# Patient Record
Sex: MALE | Race: WHITE | NOT HISPANIC OR LATINO | Employment: FULL TIME | URBAN - METROPOLITAN AREA
[De-identification: names, ages, dates, MRNs, and addresses within clinical notes are randomized per-mention and may not be internally consistent; named-entity substitution may affect disease eponyms.]

---

## 2023-06-08 DIAGNOSIS — I10 ESSENTIAL (PRIMARY) HYPERTENSION: ICD-10-CM

## 2023-06-08 RX ORDER — FLUTICASONE PROPIONATE 50 MCG
2 SPRAY, SUSPENSION (ML) NASAL
COMMUNITY
End: 2023-12-15

## 2023-06-08 NOTE — TELEPHONE ENCOUNTER
Pharmacy Request  Pt has not been seen in office since 8/10/22  Pt needs seen for refills   Please schedule appt and send back for short supply, thanks. AM

## 2023-06-12 RX ORDER — FLUTICASONE PROPIONATE 50 MCG
SPRAY, SUSPENSION (ML) NASAL
Qty: 48 ML | OUTPATIENT
Start: 2023-06-12

## 2023-08-03 ENCOUNTER — APPOINTMENT (OUTPATIENT)
Dept: PRIMARY CARE | Facility: CLINIC | Age: 48
End: 2023-08-03
Payer: COMMERCIAL

## 2023-08-08 ENCOUNTER — OFFICE VISIT (OUTPATIENT)
Dept: PRIMARY CARE | Facility: CLINIC | Age: 48
End: 2023-08-08
Payer: COMMERCIAL

## 2023-08-08 VITALS
BODY MASS INDEX: 36 KG/M2 | HEART RATE: 69 BPM | SYSTOLIC BLOOD PRESSURE: 100 MMHG | OXYGEN SATURATION: 96 % | WEIGHT: 288 LBS | TEMPERATURE: 97.2 F | DIASTOLIC BLOOD PRESSURE: 62 MMHG

## 2023-08-08 DIAGNOSIS — I48.0 PAROXYSMAL ATRIAL FIBRILLATION (MULTI): Chronic | ICD-10-CM

## 2023-08-08 DIAGNOSIS — I10 BENIGN ESSENTIAL HYPERTENSION: Chronic | ICD-10-CM

## 2023-08-08 DIAGNOSIS — E05.90 HYPERTHYROIDISM: Primary | ICD-10-CM

## 2023-08-08 DIAGNOSIS — K57.92 ACUTE DIVERTICULITIS: ICD-10-CM

## 2023-08-08 DIAGNOSIS — J45.40 MODERATE PERSISTENT ASTHMA WITHOUT COMPLICATION (HHS-HCC): Chronic | ICD-10-CM

## 2023-08-08 DIAGNOSIS — N17.9 ACUTE RENAL FAILURE, UNSPECIFIED ACUTE RENAL FAILURE TYPE (CMS-HCC): ICD-10-CM

## 2023-08-08 PROBLEM — J45.909 ASTHMA (HHS-HCC): Status: ACTIVE | Noted: 2023-08-08

## 2023-08-08 PROBLEM — I42.9 CARDIOMYOPATHY (MULTI): Chronic | Status: ACTIVE | Noted: 2023-08-08

## 2023-08-08 PROBLEM — J30.9 ALLERGIC RHINITIS: Status: ACTIVE | Noted: 2021-04-05

## 2023-08-08 PROBLEM — I50.42 CHRONIC COMBINED SYSTOLIC AND DIASTOLIC HEART FAILURE (MULTI): Status: ACTIVE | Noted: 2021-04-05

## 2023-08-08 PROBLEM — I42.9 CARDIOMYOPATHY (MULTI): Status: ACTIVE | Noted: 2023-08-08

## 2023-08-08 PROBLEM — I50.42 CHRONIC COMBINED SYSTOLIC AND DIASTOLIC HEART FAILURE (MULTI): Chronic | Status: ACTIVE | Noted: 2021-04-05

## 2023-08-08 PROBLEM — G47.33 OBSTRUCTIVE SLEEP APNEA SYNDROME: Status: ACTIVE | Noted: 2020-05-20

## 2023-08-08 PROBLEM — I48.91 ATRIAL FIBRILLATION WITH RVR (MULTI): Status: ACTIVE | Noted: 2020-05-08

## 2023-08-08 PROBLEM — J45.909 ASTHMA (HHS-HCC): Chronic | Status: ACTIVE | Noted: 2023-08-08

## 2023-08-08 PROCEDURE — 3078F DIAST BP <80 MM HG: CPT | Performed by: FAMILY MEDICINE

## 2023-08-08 PROCEDURE — 1036F TOBACCO NON-USER: CPT | Performed by: FAMILY MEDICINE

## 2023-08-08 PROCEDURE — 3074F SYST BP LT 130 MM HG: CPT | Performed by: FAMILY MEDICINE

## 2023-08-08 PROCEDURE — 99214 OFFICE O/P EST MOD 30 MIN: CPT | Performed by: FAMILY MEDICINE

## 2023-08-08 RX ORDER — ALBUTEROL SULFATE 90 UG/1
2 AEROSOL, METERED RESPIRATORY (INHALATION) EVERY 4 HOURS PRN
COMMUNITY
Start: 2020-09-03 | End: 2023-10-23 | Stop reason: SDUPTHER

## 2023-08-08 RX ORDER — LISINOPRIL 20 MG/1
1 TABLET ORAL 2 TIMES DAILY
COMMUNITY
Start: 2020-05-12

## 2023-08-08 RX ORDER — SPIRONOLACTONE 25 MG/1
25 TABLET ORAL DAILY
COMMUNITY
Start: 2020-05-12 | End: 2024-01-29 | Stop reason: SDUPTHER

## 2023-08-08 RX ORDER — AMIODARONE HYDROCHLORIDE 200 MG/1
1 TABLET ORAL DAILY
COMMUNITY
Start: 2020-05-28 | End: 2023-10-13 | Stop reason: SDUPTHER

## 2023-08-08 RX ORDER — APIXABAN 5 MG/1
5 TABLET, FILM COATED ORAL 2 TIMES DAILY
COMMUNITY
Start: 2020-05-13 | End: 2023-10-02 | Stop reason: SDUPTHER

## 2023-08-08 RX ORDER — FLUTICASONE PROPIONATE AND SALMETEROL 250; 50 UG/1; UG/1
1 POWDER RESPIRATORY (INHALATION)
COMMUNITY
Start: 2022-04-25 | End: 2023-09-29 | Stop reason: SDUPTHER

## 2023-08-08 RX ORDER — AZELASTINE 1 MG/ML
2 SPRAY, METERED NASAL 2 TIMES DAILY
COMMUNITY
Start: 2022-08-10

## 2023-08-08 RX ORDER — ALBUTEROL SULFATE 0.83 MG/ML
2.5 SOLUTION RESPIRATORY (INHALATION) EVERY 4 HOURS PRN
COMMUNITY
Start: 2022-04-07

## 2023-08-08 RX ORDER — CARVEDILOL 25 MG/1
25 TABLET ORAL
COMMUNITY
Start: 2020-05-12 | End: 2023-10-03 | Stop reason: SDUPTHER

## 2023-08-08 RX ORDER — AMOXICILLIN AND CLAVULANATE POTASSIUM 875; 125 MG/1; MG/1
1 TABLET, FILM COATED ORAL EVERY 12 HOURS
Qty: 20 TABLET | Refills: 0 | COMMUNITY
Start: 2023-08-04 | End: 2023-08-14

## 2023-08-08 ASSESSMENT — ENCOUNTER SYMPTOMS
DIARRHEA: 0
FEVER: 0
DYSURIA: 0
COUGH: 0
CHILLS: 0
SHORTNESS OF BREATH: 0
ABDOMINAL PAIN: 0
VOMITING: 0
NAUSEA: 0

## 2023-08-08 NOTE — PROGRESS NOTES
Subjective   Patient ID: Donavan Ordaz III is a 48 y.o. male who presents for Hospital Follow-up (From [facility] on 8/3. Re: A-Fib/Thyroid).    Francisco presents for ER follow-up. Went to ER after felt fatigued and had diarrhea. Was diagnosed with diverticulitis. Given rx for Augmentin. Abdominal pain improved significantly. While in ER, told that thyroid labs abnormal.         Review of Systems   Constitutional:  Negative for chills and fever.   Respiratory:  Negative for cough and shortness of breath.    Cardiovascular:  Negative for chest pain.   Gastrointestinal:  Negative for abdominal pain, diarrhea, nausea and vomiting.   Genitourinary:  Negative for dysuria.       Objective   /62   Pulse 69   Temp 36.2 °C (97.2 °F) (Temporal)   Wt 131 kg (288 lb)   SpO2 96%   BMI 36.00 kg/m²     Physical Exam  Constitutional:       General: He is not in acute distress.     Appearance: Normal appearance.   HENT:      Head: Normocephalic.      Mouth/Throat:      Mouth: Mucous membranes are moist.   Eyes:      Extraocular Movements: Extraocular movements intact.      Conjunctiva/sclera: Conjunctivae normal.   Cardiovascular:      Rate and Rhythm: Normal rate and regular rhythm.      Heart sounds: No murmur heard.  Pulmonary:      Breath sounds: No wheezing or rhonchi.   Musculoskeletal:      Cervical back: Neck supple.   Skin:     General: Skin is warm and dry.   Neurological:      Mental Status: He is alert.   Psychiatric:         Mood and Affect: Mood normal.         Behavior: Behavior normal.         Assessment/Plan   Diagnoses and all orders for this visit:  Hyperthyroidism  Comments:  Possibly due to amiodarone. Recheck thyroid labs with Grave's antibodies.  Orders:  -     Thyroxine binding globulin; Future  -     T3, free; Future  -     T4, free; Future  -     TSH; Future  -     Thyroid stimulating immunoglobulin; Future  Acute diverticulitis  Comments:  Improving. Complete course of Augmentin.  Orders:  -      CBC and Auto Differential; Future  Acute renal failure, unspecified acute renal failure type (CMS/HCC)  Comments:  Likely due to dehydration. Repeat labs ordered.  Orders:  -     Basic Metabolic Panel; Future  Paroxysmal atrial fibrillation (CMS/HCC)  -     Referral to Cardiology; Future  Benign essential hypertension  -     Referral to Cardiology; Future  Moderate persistent asthma without complication

## 2023-08-10 ENCOUNTER — LAB (OUTPATIENT)
Dept: LAB | Facility: LAB | Age: 48
End: 2023-08-10
Payer: COMMERCIAL

## 2023-08-10 DIAGNOSIS — K57.92 ACUTE DIVERTICULITIS: ICD-10-CM

## 2023-08-10 DIAGNOSIS — E05.90 HYPERTHYROIDISM: ICD-10-CM

## 2023-08-10 DIAGNOSIS — N17.9 ACUTE RENAL FAILURE, UNSPECIFIED ACUTE RENAL FAILURE TYPE (CMS-HCC): ICD-10-CM

## 2023-08-10 LAB
ANION GAP IN SER/PLAS: 16 MMOL/L (ref 10–20)
BASOPHILS (10*3/UL) IN BLOOD BY AUTOMATED COUNT: 0.06 X10E9/L (ref 0–0.1)
BASOPHILS/100 LEUKOCYTES IN BLOOD BY AUTOMATED COUNT: 0.7 % (ref 0–2)
CALCIUM (MG/DL) IN SER/PLAS: 10.2 MG/DL (ref 8.6–10.3)
CARBON DIOXIDE, TOTAL (MMOL/L) IN SER/PLAS: 22 MMOL/L (ref 21–32)
CHLORIDE (MMOL/L) IN SER/PLAS: 106 MMOL/L (ref 98–107)
CREATININE (MG/DL) IN SER/PLAS: 1.07 MG/DL (ref 0.5–1.3)
EOSINOPHILS (10*3/UL) IN BLOOD BY AUTOMATED COUNT: 0.61 X10E9/L (ref 0–0.7)
EOSINOPHILS/100 LEUKOCYTES IN BLOOD BY AUTOMATED COUNT: 7.2 % (ref 0–6)
ERYTHROCYTE DISTRIBUTION WIDTH (RATIO) BY AUTOMATED COUNT: 11.2 % (ref 11.5–14.5)
ERYTHROCYTE MEAN CORPUSCULAR HEMOGLOBIN CONCENTRATION (G/DL) BY AUTOMATED: 31.6 G/DL (ref 32–36)
ERYTHROCYTE MEAN CORPUSCULAR VOLUME (FL) BY AUTOMATED COUNT: 94 FL (ref 80–100)
ERYTHROCYTES (10*6/UL) IN BLOOD BY AUTOMATED COUNT: 4.86 X10E12/L (ref 4.5–5.9)
GFR MALE: 84 ML/MIN/1.73M2
GLUCOSE (MG/DL) IN SER/PLAS: 90 MG/DL (ref 74–99)
HEMATOCRIT (%) IN BLOOD BY AUTOMATED COUNT: 45.9 % (ref 41–52)
HEMOGLOBIN (G/DL) IN BLOOD: 14.5 G/DL (ref 13.5–17.5)
IMMATURE GRANULOCYTES/100 LEUKOCYTES IN BLOOD BY AUTOMATED COUNT: 0.4 % (ref 0–0.9)
LEUKOCYTES (10*3/UL) IN BLOOD BY AUTOMATED COUNT: 8.5 X10E9/L (ref 4.4–11.3)
LYMPHOCYTES (10*3/UL) IN BLOOD BY AUTOMATED COUNT: 1.98 X10E9/L (ref 1.2–4.8)
LYMPHOCYTES/100 LEUKOCYTES IN BLOOD BY AUTOMATED COUNT: 23.4 % (ref 13–44)
MONOCYTES (10*3/UL) IN BLOOD BY AUTOMATED COUNT: 1.12 X10E9/L (ref 0.1–1)
MONOCYTES/100 LEUKOCYTES IN BLOOD BY AUTOMATED COUNT: 13.2 % (ref 2–10)
NEUTROPHILS (10*3/UL) IN BLOOD BY AUTOMATED COUNT: 4.66 X10E9/L (ref 1.2–7.7)
NEUTROPHILS/100 LEUKOCYTES IN BLOOD BY AUTOMATED COUNT: 55.1 % (ref 40–80)
PLATELETS (10*3/UL) IN BLOOD AUTOMATED COUNT: 393 X10E9/L (ref 150–450)
POTASSIUM (MMOL/L) IN SER/PLAS: 5.2 MMOL/L (ref 3.5–5.3)
SODIUM (MMOL/L) IN SER/PLAS: 139 MMOL/L (ref 136–145)
THYROTROPIN (MIU/L) IN SER/PLAS BY DETECTION LIMIT <= 0.05 MIU/L: <0.01 MIU/L (ref 0.44–3.98)
THYROXINE (T4) FREE (NG/DL) IN SER/PLAS: 5.51 NG/DL (ref 0.61–1.12)
TRIIODOTHYRONINE (T3) FREE (PG/ML) IN SER/PLAS: >10 PG/ML (ref 2.3–4.2)
UREA NITROGEN (MG/DL) IN SER/PLAS: 33 MG/DL (ref 6–23)

## 2023-08-10 PROCEDURE — 36415 COLL VENOUS BLD VENIPUNCTURE: CPT

## 2023-08-10 PROCEDURE — 84442 ASSAY OF THYROID ACTIVITY: CPT

## 2023-08-10 PROCEDURE — 84445 ASSAY OF TSI GLOBULIN: CPT

## 2023-08-10 PROCEDURE — 84481 FREE ASSAY (FT-3): CPT

## 2023-08-10 PROCEDURE — 85025 COMPLETE CBC W/AUTO DIFF WBC: CPT

## 2023-08-10 PROCEDURE — 84439 ASSAY OF FREE THYROXINE: CPT

## 2023-08-10 PROCEDURE — 84443 ASSAY THYROID STIM HORMONE: CPT

## 2023-08-10 PROCEDURE — 80048 BASIC METABOLIC PNL TOTAL CA: CPT

## 2023-08-14 LAB — THYROXINE BINDING GLOBULIN: 14.8 UG/ML (ref 13–30)

## 2023-08-16 ENCOUNTER — TELEPHONE (OUTPATIENT)
Dept: PRIMARY CARE | Facility: CLINIC | Age: 48
End: 2023-08-16
Payer: COMMERCIAL

## 2023-08-16 NOTE — TELEPHONE ENCOUNTER
----- Message from Gloria Barger DO sent at 8/16/2023  1:24 PM EDT -----  Please let patient know that his thyroid labs are significantly abnormal. These abnormalities can cause abnormal heart rhythms. Does he have an appointment scheduled with his cardiologist? Need to get cardiology opinion on whether amiodarone is causing thyroid issues as soon as possible.

## 2023-08-17 LAB — THYROID STIMULATING IMMUNOGLOBULIN: <1 TSI INDEX

## 2023-08-18 NOTE — TELEPHONE ENCOUNTER
Anne returned call- informed of provider appendage. She did state that he has an appt with Cardio this coming Tuesday and they will discuss the Amiodarone. Thanks, CG

## 2023-09-14 ENCOUNTER — HOSPITAL ENCOUNTER (OUTPATIENT)
Dept: DATA CONVERSION | Facility: HOSPITAL | Age: 48
End: 2023-09-14
Attending: INTERNAL MEDICINE | Admitting: INTERNAL MEDICINE
Payer: COMMERCIAL

## 2023-09-14 DIAGNOSIS — I48.0 PAROXYSMAL ATRIAL FIBRILLATION (MULTI): ICD-10-CM

## 2023-09-14 DIAGNOSIS — I42.9 CARDIOMYOPATHY, UNSPECIFIED (MULTI): ICD-10-CM

## 2023-09-14 LAB
ANION GAP IN SER/PLAS: 15 MMOL/L (ref 10–20)
ATRIAL RATE: 88 BPM
CALCIUM (MG/DL) IN SER/PLAS: 9.5 MG/DL (ref 8.6–10.3)
CARBON DIOXIDE, TOTAL (MMOL/L) IN SER/PLAS: 22 MMOL/L (ref 21–32)
CHLORIDE (MMOL/L) IN SER/PLAS: 107 MMOL/L (ref 98–107)
CREATININE (MG/DL) IN SER/PLAS: 1.05 MG/DL (ref 0.5–1.3)
ERYTHROCYTE DISTRIBUTION WIDTH (RATIO) BY AUTOMATED COUNT: 12.8 % (ref 11.5–14.5)
ERYTHROCYTE MEAN CORPUSCULAR HEMOGLOBIN CONCENTRATION (G/DL) BY AUTOMATED: 32.6 G/DL (ref 32–36)
ERYTHROCYTE MEAN CORPUSCULAR VOLUME (FL) BY AUTOMATED COUNT: 90 FL (ref 80–100)
ERYTHROCYTES (10*6/UL) IN BLOOD BY AUTOMATED COUNT: 4.92 X10E12/L (ref 4.5–5.9)
GFR MALE: 86 ML/MIN/1.73M2
GLUCOSE (MG/DL) IN SER/PLAS: 85 MG/DL (ref 74–99)
HEMATOCRIT (%) IN BLOOD BY AUTOMATED COUNT: 44.5 % (ref 41–52)
HEMOGLOBIN (G/DL) IN BLOOD: 14.5 G/DL (ref 13.5–17.5)
LEUKOCYTES (10*3/UL) IN BLOOD BY AUTOMATED COUNT: 8.5 X10E9/L (ref 4.4–11.3)
P AXIS: 39 DEGREES
P OFFSET: 190 MS
P ONSET: 130 MS
PLATELETS (10*3/UL) IN BLOOD AUTOMATED COUNT: 309 X10E9/L (ref 150–450)
POTASSIUM (MMOL/L) IN SER/PLAS: 4.7 MMOL/L (ref 3.5–5.3)
PR INTERVAL: 174 MS
Q ONSET: 217 MS
QRS COUNT: 14 BEATS
QRS DURATION: 88 MS
QT INTERVAL: 366 MS
QTC CALCULATION(BAZETT): 442 MS
QTC FREDERICIA: 416 MS
R AXIS: 16 DEGREES
SODIUM (MMOL/L) IN SER/PLAS: 139 MMOL/L (ref 136–145)
T AXIS: 22 DEGREES
T OFFSET: 400 MS
UREA NITROGEN (MG/DL) IN SER/PLAS: 21 MG/DL (ref 6–23)
VENTRICULAR RATE: 88 BPM

## 2023-09-29 DIAGNOSIS — J45.40 MODERATE PERSISTENT ASTHMA WITHOUT COMPLICATION (HHS-HCC): Primary | Chronic | ICD-10-CM

## 2023-09-29 NOTE — TELEPHONE ENCOUNTER
Pt called rx line at 1255 requesting refill on inhaler to go to [email or web address].  Pt next OV 2/2024.  Pt is compliant.  Pt uses [email or web address]. Thanks, CG

## 2023-09-30 NOTE — H&P
History & Physical Reviewed:   I have reviewed the History and Physical dated:  22-Aug-2023   History and Physical reviewed and relevant findings noted. Patient examined to review pertinent physical  findings.: No significant changes   Home Medications Reviewed: no changes noted   Allergies Reviewed: no changes noted       Airway/Sedation Assessment:  ·  Emotional Status calm   ·  Neurologic alert & oriented x 3   ·  Respiratory clear to auscultation   ·  Cardiovascular irregular rate and rhythm   ·  Mouth Opening OK yes   ·  Neck Flexibility OK yes   ·  Loose Teeth no   ·  Oropharyngeal Classification Class II   ·  ASA PS Classification ASA III   ·  Sedation Plan moderate sedation       ERAS (Enhanced Recovery After Surgery):  ·  ERAS Patient: no     Consent:   COVID-19 Consent:  ·  COVID-19 Risk Consent Surgeon has reviewed key risks related to the risk of narinder COVID-19 and if they contract COVID-19 what the risks are.     Assessment/Plan:   ·  Assessment and Plan    Impression: Afib     Plan: DCC      Electronic Signatures:  Bree Martinez (APRN-CNP)  (Signed 14-Sep-2023 14:24)   Authored: History & Physical Reviewed, Airway/Sedation,  ERAS, Consent, Assessment/Plan, Note Completion      Last Updated: 14-Sep-2023 14:24 by Bree Martinez (APRN-CNP)

## 2023-10-01 DIAGNOSIS — I48.91 ATRIAL FIBRILLATION, UNSPECIFIED TYPE (MULTI): Primary | ICD-10-CM

## 2023-10-02 RX ORDER — APIXABAN 5 MG/1
5 TABLET, FILM COATED ORAL 2 TIMES DAILY
Qty: 180 TABLET | Refills: 1 | Status: SHIPPED | OUTPATIENT
Start: 2023-10-02 | End: 2024-01-29 | Stop reason: SDUPTHER

## 2023-10-02 RX ORDER — FLUTICASONE PROPIONATE AND SALMETEROL 250; 50 UG/1; UG/1
1 POWDER RESPIRATORY (INHALATION)
Qty: 60 EACH | Refills: 5 | Status: SHIPPED | OUTPATIENT
Start: 2023-10-02

## 2023-10-03 DIAGNOSIS — I48.91 ATRIAL FIBRILLATION, UNSPECIFIED TYPE (MULTI): Primary | ICD-10-CM

## 2023-10-03 RX ORDER — CARVEDILOL 25 MG/1
25 TABLET ORAL
Qty: 180 TABLET | Refills: 1 | Status: SHIPPED | OUTPATIENT
Start: 2023-10-03 | End: 2024-01-26

## 2023-10-03 RX ORDER — AMIODARONE HYDROCHLORIDE 200 MG/1
200 TABLET ORAL DAILY
Qty: 90 TABLET | Refills: 1 | Status: CANCELLED | OUTPATIENT
Start: 2023-10-03 | End: 2024-10-02

## 2023-10-04 PROBLEM — E66.01 CLASS 2 SEVERE OBESITY DUE TO EXCESS CALORIES WITH SERIOUS COMORBIDITY AND BODY MASS INDEX (BMI) OF 36.0 TO 36.9 IN ADULT (MULTI): Status: ACTIVE | Noted: 2023-10-04

## 2023-10-04 PROBLEM — Z79.899 LONG TERM CURRENT USE OF AMIODARONE: Status: ACTIVE | Noted: 2023-10-04

## 2023-10-04 PROBLEM — E66.812 CLASS 2 SEVERE OBESITY DUE TO EXCESS CALORIES WITH SERIOUS COMORBIDITY AND BODY MASS INDEX (BMI) OF 36.0 TO 36.9 IN ADULT: Status: ACTIVE | Noted: 2023-10-04

## 2023-10-04 PROBLEM — Z79.01 ON APIXABAN THERAPY: Status: ACTIVE | Noted: 2023-10-04

## 2023-10-09 ENCOUNTER — APPOINTMENT (OUTPATIENT)
Dept: CARDIOLOGY | Facility: CLINIC | Age: 48
End: 2023-10-09
Payer: COMMERCIAL

## 2023-10-09 ENCOUNTER — TELEMEDICINE (OUTPATIENT)
Dept: PRIMARY CARE | Facility: CLINIC | Age: 48
End: 2023-10-09
Payer: COMMERCIAL

## 2023-10-09 DIAGNOSIS — R06.2 WHEEZING: ICD-10-CM

## 2023-10-09 DIAGNOSIS — R05.1 ACUTE COUGH: Primary | ICD-10-CM

## 2023-10-09 DIAGNOSIS — J01.90 ACUTE NON-RECURRENT SINUSITIS, UNSPECIFIED LOCATION: ICD-10-CM

## 2023-10-09 PROCEDURE — 99213 OFFICE O/P EST LOW 20 MIN: CPT | Performed by: FAMILY MEDICINE

## 2023-10-09 RX ORDER — BENZONATATE 200 MG/1
200 CAPSULE ORAL 3 TIMES DAILY PRN
Qty: 42 CAPSULE | Refills: 0 | Status: SHIPPED | OUTPATIENT
Start: 2023-10-09 | End: 2023-10-23 | Stop reason: WASHOUT

## 2023-10-09 ASSESSMENT — LIFESTYLE VARIABLES: HISTORY_OF_SMOKING: I HAVE NEVER SMOKED

## 2023-10-09 NOTE — PROGRESS NOTES
Sinus sxs started last week--6 days--overall staying the same  Facial pressure  Usually goes into lungs  Coughing a lot--using albuterol-- no relief  Has wheezing at night--  No heavy chest of SOB  No F, chills aches  No N,V,D  +HA  ST from PND  Drainage is clear   No seasonal allergies  OTC meds--tylenol--    BENZONATATE  NO mucinex DM because he is concerned it will raise his BP    ALL OTHER ROS (-)    General appearance:  Vitals available from patient?No  Alert, oriented, pleasant, in no apparent distress Yes  Answers questions appropriatelyYes  Eyes clear?Yes  Throat exam Not Available  Is patient in respiratory distressNo  Is patient coughing during consult:Yes  Audible wheezing noted? :No  Pt sounds congested?: No  Sniffing or rhinorrhea?: No  Frontal sinus TTP by palpation? Yes  Maxillary sinus TTP by palpation?Yes  Tender neck LAD by pt exam?:No  Psychiatric: Affect normalYes  Other relevant physical exam:      Pt location in [place] and consent obtained. Telemedicine appropriate evaluation completed. Appropriate physical exam done.    6 days of sinus sxs-- discussed viral vs bacterial  Pt reached out next day when symptoms worsened to request abx and prednisone (usually needs this when cough gets bad and albuterol no longer relieves symptoms) rx written for augmentin and prednisone (all AE discussed of prednisone)

## 2023-10-10 RX ORDER — PREDNISONE 20 MG/1
40 TABLET ORAL DAILY
Qty: 10 TABLET | Refills: 0 | Status: SHIPPED | OUTPATIENT
Start: 2023-10-10 | End: 2023-10-15

## 2023-10-10 RX ORDER — AMOXICILLIN AND CLAVULANATE POTASSIUM 875; 125 MG/1; MG/1
1 TABLET, FILM COATED ORAL 2 TIMES DAILY
Qty: 14 TABLET | Refills: 0 | Status: SHIPPED | OUTPATIENT
Start: 2023-10-10 | End: 2023-10-23 | Stop reason: WASHOUT

## 2023-10-13 ENCOUNTER — PHARMACY VISIT (OUTPATIENT)
Dept: PHARMACY | Facility: CLINIC | Age: 48
End: 2023-10-13
Payer: COMMERCIAL

## 2023-10-13 DIAGNOSIS — I48.0 PAROXYSMAL ATRIAL FIBRILLATION (MULTI): Primary | Chronic | ICD-10-CM

## 2023-10-13 PROCEDURE — RXMED WILLOW AMBULATORY MEDICATION CHARGE

## 2023-10-13 RX ORDER — AMIODARONE HYDROCHLORIDE 200 MG/1
200 TABLET ORAL DAILY
Qty: 90 TABLET | Refills: 1 | Status: SHIPPED | OUTPATIENT
Start: 2023-10-13 | End: 2024-01-29 | Stop reason: SDUPTHER

## 2023-10-14 NOTE — PATIENT INSTRUCTIONS
Please send me a [employer] message if you have any questions or concerns.  FOR NON URGENT questions only.  Allow up to 72 hours for response.    If you have prescription issues or other questions you can email   Lori Javed,  Digital Health Coordinator, at   [email or web address]    6 days of sinus sxs-- discussed viral vs bacterial  Pt reached out next day when symptoms worsened to request abx and prednisone (usually needs this when cough gets bad and albuterol no longer relieves symptoms) rx written for augmentin and prednisone (all AE discussed of prednisone)    Rest and drink plenty of fluids    Tylenol and or motrin as needed for pain and fever (unless you have been told not to take these because of your personal medical history)    Discussed options and precautions:   Viral versus bacterial infection; use of medications; possible side effects; appropriate over-the-counter medications; possible complications and /or when to follow-up.    Follow-up in 1 to 2 days if not improving.  Follow-up immediately if symptoms worsen.    All red flags requiring in person care were discussed.  All patient's questions were answered.    Limitations to telemedicine include inability to do a complete and accurate physical exam.  Any concerns regarding this were conveyed with the patient and in person follow-up recommended if patient nature of illness does not progress as anticipated during this visit.    Over-the-counter cold and cough medications    Take Mucinex for cough, drink plenty of fluids with this medication and will help break up congestion making it easier to cough up    For cough can use honey (children ages 1 and up) in hot tea or hot water. I recommend putting this in an insulated cup and carrying it around throughout the day to sip on.  Have it at your bedside at night in case you wake up coughing.  You can also use honey cough drops (adults and older children).    Recommend nasal saline for use in  children and adults.  Neti Rand can also be helpful.  (Never used tap water and a Neti pot.  Use distilled water.)    If you have plugged up congested ears or the feeling of fluid in your ears, you can use an over-the-counter nasal steroid spray like fluticasone (brand name Flonase) use 2 sprays into each nostril once or twice a day for 7 days.  This will help open up the eustachian tubes and allow the fluid to drain out of your ears.    Sleeping with your head/chest elevated can help with sinus drainage.    Adults only-can use nasal decongestant (like Afrin) at bedtime to open nasal passages so you can breathe through your nose while you sleep; avoid using for longer than 3 days as this medication can become addicting.  Do not use if you have high blood pressure or high heart rate.    For severe pain or fever in adult-Tylenol (2 extra strength) or ibuprofen (3-4 tabs equals 600 to 800 mg) alternating as needed for pain.  Tylenol doses should be 6 to 8 hours apart and ibuprofen doses should be 6 to 8 hours apart.        Common cold medications for adults explained:    **Cold medications for children are not recommended-only Tylenol, Motrin, honey (only for children older than 1 year), cool-mist humidifier, and nasal saline spray are recommended for children.    Mucinex-(generic name guaifenesin)-is an expectorant.  This will thin out all the thick mucus.  Must drink plenty of liquids for this medicine to work.    Dextromethorphan (brand name Delsym or DM)-this medicine is a cough suppressant    Honey in hot water or tea-this is a natural cough suppressant    Decongestant nasal spray- (eg: Afrin) use for temporary relief of nasal congestion-best when used at bedtime to open up nasal passages so that you are not forced to mouth breathe overnight.    Sudafed (generic name pseudoephedrine-this must be bought from the pharmacist) DO NOT use this medicine if you have high blood pressure as it can raise your blood  pressure higher.  Do not use if you have any irregular heart rate.  This medicine can help clear congestion in your sinuses.

## 2023-10-23 ENCOUNTER — TELEMEDICINE (OUTPATIENT)
Dept: PRIMARY CARE | Facility: CLINIC | Age: 48
End: 2023-10-23
Payer: COMMERCIAL

## 2023-10-23 ENCOUNTER — APPOINTMENT (OUTPATIENT)
Dept: CARDIOLOGY | Facility: CLINIC | Age: 48
End: 2023-10-23
Payer: COMMERCIAL

## 2023-10-23 DIAGNOSIS — J45.901 MODERATE ACUTE EXACERBATION OF ASTHMA (HHS-HCC): ICD-10-CM

## 2023-10-23 DIAGNOSIS — J01.90 ACUTE SINUSITIS, RECURRENCE NOT SPECIFIED, UNSPECIFIED LOCATION: Primary | ICD-10-CM

## 2023-10-23 DIAGNOSIS — J45.40 MODERATE PERSISTENT ASTHMA WITHOUT COMPLICATION (HHS-HCC): Chronic | ICD-10-CM

## 2023-10-23 PROCEDURE — 99214 OFFICE O/P EST MOD 30 MIN: CPT | Performed by: PHYSICIAN ASSISTANT

## 2023-10-23 RX ORDER — DOXYCYCLINE 100 MG/1
100 CAPSULE ORAL 2 TIMES DAILY
Qty: 20 CAPSULE | Refills: 0 | Status: SHIPPED | OUTPATIENT
Start: 2023-10-23 | End: 2023-11-02

## 2023-10-23 RX ORDER — ALBUTEROL SULFATE 90 UG/1
2 AEROSOL, METERED RESPIRATORY (INHALATION) EVERY 4 HOURS PRN
Qty: 18 G | Refills: 1 | Status: SHIPPED | OUTPATIENT
Start: 2023-10-23 | End: 2023-12-26 | Stop reason: SDUPTHER

## 2023-10-23 RX ORDER — PREDNISONE 10 MG/1
TABLET ORAL
Qty: 20 TABLET | Refills: 0 | Status: SHIPPED | OUTPATIENT
Start: 2023-10-23 | End: 2023-10-30

## 2023-10-23 ASSESSMENT — ENCOUNTER SYMPTOMS
CHILLS: 0
SWEATS: 0
SHORTNESS OF BREATH: 1
WHEEZING: 0
RHINORRHEA: 1
HEMOPTYSIS: 0
SORE THROAT: 0
FEVER: 0
COUGH: 1
HEARTBURN: 0
MYALGIAS: 0
HEADACHES: 0
WEIGHT LOSS: 0

## 2023-10-23 NOTE — PROGRESS NOTES
Subjective   Patient ID: Francisco Ordaz III is a 48 y.o. male who presents for URI (And SOB).    Virtual or Telephone Consent    An interactive audio and video telecommunication system which permits real time communications between the patient (at the originating site) and provider (at the distant site) was utilized to provide this telehealth service.   Verbal consent was requested and obtained from Donavan Ordaz III on this date, 10/23/23 for a telehealth visit.     Cough  This is a new problem. The current episode started 1 to 4 weeks ago. The problem has been gradually worsening. The problem occurs every few minutes. The cough is Productive of sputum. Associated symptoms include ear congestion, nasal congestion, postnasal drip, rhinorrhea and shortness of breath. Pertinent negatives include no chest pain, chills, fever, headaches, heartburn, hemoptysis, myalgias, rash, sore throat, sweats, weight loss or wheezing.   Lungs feel tight and has mild SOB. Not been using his Wixela inhaler.     Patient denies recent known COVID exposure or international travel.     Had virtual appt with urgent care 10/9/23.  Was on Augmentin, which helped sinus symptoms but symptoms didn't resolve and have worsened again the past week.     reports that he has never smoked. He has never used smokeless tobacco.     No Known Allergies    Review of Systems   Constitutional:  Negative for chills, fever and weight loss.   HENT:  Positive for postnasal drip and rhinorrhea. Negative for sore throat.    Respiratory:  Positive for cough and shortness of breath. Negative for hemoptysis and wheezing.    Cardiovascular:  Negative for chest pain.   Gastrointestinal:  Negative for heartburn.   Musculoskeletal:  Negative for myalgias.   Skin:  Negative for rash.   Neurological:  Negative for headaches.       Objective   There were no vitals taken for this visit.    Physical Exam  Constitutional:       General: He is not in acute distress.      Appearance: Normal appearance.   HENT:      Head: Normocephalic.   Eyes:      General: No scleral icterus.  Pulmonary:      Effort: Pulmonary effort is normal.   Neurological:      Mental Status: He is alert.         Assessment/Plan   Diagnoses and all orders for this visit:  Acute sinusitis, recurrence not specified, unspecified location  -     doxycycline (Vibramycin) 100 mg capsule; Take 1 capsule (100 mg) by mouth 2 times a day for 10 days.  Moderate acute exacerbation of asthma  -     predniSONE (Deltasone) 10 mg tablet; Take 4 tablets (40 mg) by mouth once daily for 2 days, THEN 3 tablets (30 mg) once daily for 2 days, THEN 2 tablets (20 mg) once daily for 2 days, THEN 1 tablet (10 mg) once daily for 2 days.  Moderate persistent asthma without complication  -     albuterol 90 mcg/actuation inhaler; Inhale 2 puffs every 4 hours if needed for wheezing or shortness of breath.         Rx doxycycline.   Prednisone taper.  Restart Wixela inhaler.   Use Mucinex DM.  Encourage fluids and rest.  Follow-up if symptoms significant increase or persist.      Duration of video visit: 9m 42s

## 2023-11-01 PROBLEM — E66.9 OBESITY (BMI 30-39.9): Status: ACTIVE | Noted: 2023-11-01

## 2023-11-06 ENCOUNTER — APPOINTMENT (OUTPATIENT)
Dept: CARDIOLOGY | Facility: CLINIC | Age: 48
End: 2023-11-06
Payer: COMMERCIAL

## 2023-12-11 ENCOUNTER — APPOINTMENT (OUTPATIENT)
Dept: CARDIOLOGY | Facility: CLINIC | Age: 48
End: 2023-12-11
Payer: COMMERCIAL

## 2023-12-15 DIAGNOSIS — I42.9 CARDIOMYOPATHY, UNSPECIFIED TYPE (MULTI): Primary | Chronic | ICD-10-CM

## 2023-12-15 DIAGNOSIS — I10 BENIGN ESSENTIAL HYPERTENSION: Chronic | ICD-10-CM

## 2023-12-15 DIAGNOSIS — J30.9 ALLERGIC RHINITIS, UNSPECIFIED SEASONALITY, UNSPECIFIED TRIGGER: Primary | ICD-10-CM

## 2023-12-15 PROCEDURE — RXMED WILLOW AMBULATORY MEDICATION CHARGE

## 2023-12-15 RX ORDER — FLUTICASONE PROPIONATE 50 MCG
2 SPRAY, SUSPENSION (ML) NASAL
Qty: 48 ML | Refills: 1 | Status: SHIPPED | OUTPATIENT
Start: 2023-12-15

## 2023-12-18 PROCEDURE — RXMED WILLOW AMBULATORY MEDICATION CHARGE

## 2023-12-18 RX ORDER — SPIRONOLACTONE 25 MG/1
25 TABLET ORAL DAILY
Qty: 90 TABLET | Refills: 1 | Status: SHIPPED | OUTPATIENT
Start: 2023-12-18 | End: 2024-12-17

## 2023-12-18 RX ORDER — LISINOPRIL 20 MG/1
20 TABLET ORAL 2 TIMES DAILY
Qty: 180 TABLET | Refills: 1 | Status: SHIPPED | OUTPATIENT
Start: 2023-12-18 | End: 2024-01-29 | Stop reason: SDUPTHER

## 2023-12-19 ENCOUNTER — PHARMACY VISIT (OUTPATIENT)
Dept: PHARMACY | Facility: CLINIC | Age: 48
End: 2023-12-19
Payer: COMMERCIAL

## 2023-12-26 ENCOUNTER — TELEMEDICINE (OUTPATIENT)
Dept: PRIMARY CARE | Facility: CLINIC | Age: 48
End: 2023-12-26
Payer: COMMERCIAL

## 2023-12-26 DIAGNOSIS — J40 BRONCHITIS: Primary | ICD-10-CM

## 2023-12-26 DIAGNOSIS — J45.21 MILD INTERMITTENT ASTHMA WITH EXACERBATION (HHS-HCC): ICD-10-CM

## 2023-12-26 DIAGNOSIS — J45.40 MODERATE PERSISTENT ASTHMA WITHOUT COMPLICATION (HHS-HCC): Chronic | ICD-10-CM

## 2023-12-26 PROCEDURE — 99213 OFFICE O/P EST LOW 20 MIN: CPT | Performed by: FAMILY MEDICINE

## 2023-12-26 RX ORDER — ALBUTEROL SULFATE 90 UG/1
2 AEROSOL, METERED RESPIRATORY (INHALATION) EVERY 4 HOURS PRN
Qty: 18 G | Refills: 1 | Status: SHIPPED | OUTPATIENT
Start: 2023-12-26

## 2023-12-26 RX ORDER — AMOXICILLIN AND CLAVULANATE POTASSIUM 875; 125 MG/1; MG/1
875 TABLET, FILM COATED ORAL 2 TIMES DAILY
Qty: 20 TABLET | Refills: 0 | Status: SHIPPED | OUTPATIENT
Start: 2023-12-26 | End: 2024-01-05

## 2023-12-26 RX ORDER — PREDNISONE 20 MG/1
40 TABLET ORAL DAILY
Qty: 10 TABLET | Refills: 0 | Status: SHIPPED | OUTPATIENT
Start: 2023-12-26 | End: 2023-12-31

## 2023-12-26 ASSESSMENT — ENCOUNTER SYMPTOMS
COUGH: 1
FEVER: 0
WHEEZING: 1
SHORTNESS OF BREATH: 1
FATIGUE: 0
SINUS PAIN: 1
SINUS PRESSURE: 1

## 2023-12-26 NOTE — PROGRESS NOTES
Subjective   Patient ID: Francisco Ordaz III is a 48 y.o. male who presents for No chief complaint on file..    Virtual or Telephone Consent    An interactive audio and video telecommunication system which permits real time communications between the patient (at the originating site) and provider (at the distant site) was utilized to provide this telehealth service.   Verbal consent was requested and obtained from Donavan Ordaz III on this date, 12/26/23 for a telehealth visit.       Pt presents with 2 weeks of nasal congestion and sinus pressure  Cough is productive of green phlegm as well   He took a flight recently and this is typically when he gets sick  He has asthma, steroids have helped in the past         Review of Systems   Constitutional:  Negative for fatigue and fever.   HENT:  Positive for congestion, sinus pressure and sinus pain.    Respiratory:  Positive for cough, shortness of breath and wheezing.        Objective   There were no vitals taken for this visit.    Physical Exam  Constitutional:       Appearance: Normal appearance.   HENT:      Nose: Congestion present.   Pulmonary:      Effort: Pulmonary effort is normal.   Neurological:      Mental Status: He is alert.       Virtual Visit Duration: 8 min  Photo ID: verified  NKDA: verified      Assessment/Plan   Diagnoses and all orders for this visit:  Bronchitis  -     amoxicillin-pot clavulanate (Augmentin) 875-125 mg tablet; Take 1 tablet (875 mg) by mouth 2 times a day for 10 days.  Mild intermittent asthma with exacerbation  -     predniSONE (Deltasone) 20 mg tablet; Take 2 tablets (40 mg) by mouth once daily for 5 days.  Moderate persistent asthma without complication  -     albuterol 90 mcg/actuation inhaler; Inhale 2 puffs every 4 hours if needed for wheezing or shortness of breath.      I counseled patient regarding the risks, benefits and side effects of antibiotics, as well as steroids. Recc supportive care, fluids, rest, Mucinex  I  advised the patient to have an in person exam with PCP, urgent care, or Emergency Room with any exacerbation of symptoms. The patient understands and agrees.   Falguni Dugan, DO

## 2024-01-23 NOTE — PROGRESS NOTES
[facility] Card[facility]    Patient Name: Donavan Ordaz III  Patient : 1975      Scribe Attestation  By signing my name below, ITonia Scribe   attest that this documentation has been prepared under the direction and in the presence of Tony Mckeon DO.      Reason for visit:  This is a 48-year-old male here for follow-up regarding paroxysmal atrial fibrillation, anticoagulation with apixaban, antiarrhythmic drug therapy with amiodarone, cardiomyopathy with ejection fraction zully of 20% now 45%, hypertension, obstructive sleep apnea, and obesity.     HPI:  This is a 48-year-old male here for follow-up regarding paroxysmal atrial fibrillation, anticoagulation with apixaban, antiarrhythmic drug therapy with amiodarone, cardiomyopathy with ejection fraction zully of 20% now 45%, hypertension, obstructive sleep apnea, and obesity.  The patient was last evaluated by me in 2023.  At that visit I had ordered an echocardiogram, referred the patient to EP cardiology for evaluation of ablation, referred the patient to endocrinology, scheduled him for an electrical cardioversion, and asked that he follow-up in 1 month.  The patient underwent successful cardioversion on 2023.  CBC done in 2023 showed a hemoglobin of 14.5, BMP showed normal serum sodium and potassium with a serum creatinine of 1.05.  TSH done in August was less than 0.01.  Echocardiogram done in 2023 showed mildly reduced left ventricular systolic function with an ejection fraction of 45 to 50%, dilated right ventricle with normal right ventricular systolic function, biatrial dilatation, mild dilatation of the ascending aorta measured at 4.0 cm. ECG done today showed ***         Assessment/Plan:   1. Paroxysmal atrial fibrillation  The patient has a history of paroxysmal atrial fibrillation and underwent successful cardioversion on 2023.   He is on apixaban for  thromboembolism prophylaxis, which should be continued.  He should continue carvedilol for heart rate control and amiodarone to reduce his burden of atrial fibrillation.  ECG done today showed ***  He denies chest pain, palpitations or lightheadedness.   Echocardiogram done in September 2023 showed mildly reduced left ventricular systolic function with an ejection fraction of 45 to 50%, dilated right ventricle with normal right ventricular systolic function, biatrial dilatation, mild dilatation of the ascending aorta measured at 4.0 cm.   Recent lab works as noted in the HPI.   Lab works as noted below will be done in 3 months prior to his next visit.   Follow up in 3 months and sooner if necessary.      2. Anticoagulation with apixaban  The patient is on anticoagulation with apixaban for paroxysmal atrial fibrillation.  Recent lab works as noted in the HPI.  Follow up in 3 months and sooner if necessary.      3. Antiarrhythmic drug therapy with amiodarone  The patient is on antiarrhythmic drug therapy with amiodarone for paroxysmal atrial fibrillation.  Recent lab works as noted in the HPI.  Lab works as noted below will be done in 3 months prior to his next visit.     4. Cardiomyopathy  The patient has a history of cardiomyopathy with ejection fraction zully of 20% now 45%.  Echocardiogram done in September 2023 showed mildly reduced left ventricular systolic function with an ejection fraction of 45 to 50%, dilated right ventricle with normal right ventricular systolic function, biatrial dilatation, mild dilatation of the ascending aorta measured at 4.0 cm.   He does not appear significantly volume overloaded on exam today.  He should continue his current cardiac medications.  Recent lab works as noted in the HPI.  Lab works as noted below will be done in 3 months prior to his next visit.   I discussed with him the importance of following a low-sodium heart healthy diet as well as weight loss.   Follow up in 3  months and sooner if necessary.      5. Hypertension  Patient has a history of hypertension which appears controlled on exam today.  He should continue his current antihypertensive medications.      6. Obstructive sleep apnea  Management as per PCP.     7. Obesity  Please see lifestyle recommendations below.       Orders:   BMP/BNP/magnesium in 3 months,   Follow-up in 3 months      Lifestyle Recommendations  I recommend a whole-food plant-based diet, an eating pattern that encourages the consumption of unrefined plant foods (such as fruits, vegetables, tubers, whole grains, legumes, nuts and seeds) and discourages meats, dairy products, eggs and processed foods.     The AHA/ACC recommends that the patient consume a dietary pattern that emphasizes intake of vegetables, fruits, and whole grains; includes low-fat dairy products, poultry, fish, legumes, non-tropical vegetable oils, and nuts; and limits intake of sodium, sweets, sugar-sweetened beverages, and red meats.  Adapt this dietary pattern to appropriate calorie requirements (a 500-750 kcal/day deficit to loose weight), personal and cultural food preferences, and nutrition therapy for other medical conditions (including diabetes).  Achieve this pattern by following plans such as the [place] Mediterranean, DASH dietary pattern, or AHA diet.     Engage in 2 hours and 30 minutes per week of moderate-intensity physical activity, or 1 hour and 15 minutes (75 minutes) per week of vigorous-intensity aerobic physical activity, or an equivalent combination of moderate and vigorous-intensity aerobic physical activity. Aerobic activity should be performed in episodes of at least 10 minutes preferably spread throughout the week.     Adhering to a heart healthy diet, regular exercise habits, avoidance of tobacco products, and maintenance of a healthy weight are crucial components of their heart disease risk reduction.     Any positive review of systems not specifically  addressed in the office visit today should be evaluated and treated by the patients primary care physician or in an emergency department if necessary     Patient was notified that results from ordered tests will be called to the patient if it changes current management; it will otherwise be discussed at a future appointment and available on [facility].     Thank you for allowing me to participate in the care of this patient.        This document was generated using the assistance of voice recognition software. If there are any errors of spelling, grammar, syntax, or meaning; please feel free to contact me directly for clarification.    Past Medical History:  He has a past medical history of Personal history of other diseases of the respiratory system (01/03/2018), Personal history of other diseases of the respiratory system (10/30/2019), and Unspecified foreign body in bronchus causing asphyxiation, initial encounter (10/01/2020).    Past Surgical History:  He has a past surgical history that includes Other surgical history (05/21/2020) and Other surgical history (05/20/2020).      Social History:  He reports that he has never smoked. He has never used smokeless tobacco. He reports that he does not currently use alcohol. He reports that he does not use drugs.    Family History:  Family History   Problem Relation Name Age of Onset    Hypertension Mother          Allergies:  Patient has no known allergies.    Outpatient Medications:  Current Outpatient Medications   Medication Instructions    albuterol 90 mcg/actuation inhaler 2 puffs, inhalation, Every 4 hours PRN    albuterol 2.5 mg, nebulization, Every 4 hours PRN    amiodarone (Pacerone) 200 mg tablet TAKE 1 TABLET BY MOUTH DAILY    azelastine (Astelin) 137 mcg (0.1 %) nasal spray 2 sprays, Each Nostril, 2 times daily    carvedilol (COREG) 25 mg, oral, 2 times daily with meals    Eliquis 5 mg, oral, 2 times daily    fluticasone (Flonase) 50 mcg/actuation nasal  "spray 2 sprays, Each Nostril, Daily before breakfast    lisinopril 20 mg tablet 1 tablet, oral, 2 times daily    lisinopril 20 mg tablet TAKE 1 TABLET BY MOUTH TWO TIMES A DAY    spironolactone (Aldactone) 25 mg tablet TAKE 1 TABLET DAILY.    spironolactone (ALDACTONE) 25 mg, oral, Daily    Wixela Inhub 250-50 mcg/dose diskus inhaler 1 puff, inhalation, 2 times daily RT        ROS:  A 14 point review of systems was done and is negative other than as stated in HPI    Vitals:      5/11/2021     2:34 PM 1/21/2022    11:04 AM 4/25/2022     2:48 PM 2/15/2023    12:39 PM 3/13/2023     9:30 AM 8/8/2023    11:52 AM 8/22/2023     4:05 PM   Vitals   Systolic 154 150 157 140  100 120   Diastolic 98 100 117 92  62 80   Heart Rate  58 70 78  69 114   Temp      36.2 °C (97.2 °F)    Resp  16 17       Height (in)  1.88 m (6' 2\") 1.88 m (6' 2\") 1.88 m (6' 2\") 1.905 m (6' 3\")  1.905 m (6' 3\")   Weight (lb)  294 307.44 323 323 288 283   BMI  37.75 kg/m2 39.47 kg/m2 41.47 kg/m2 40.37 kg/m2 36 kg/m2 35.37 kg/m2   BSA (m2)  2.64 m2 2.69 m2 2.77 m2 2.79 m2 2.63 m2 2.6 m2        Physical Exam:   ***  Constitutional: Cooperative, in no acute distress, alert, appears stated age.  Skin: Skin color, texture, turgor normal. No rashes or lesions.  Head: Normocephalic. No masses, lesions, tenderness or abnormalities  Eyes: Extraocular movements are grossly intact.  Mouth and throat: Mucous membranes moist  Neck: Neck supple, no carotid bruits, no JVD  Respiratory: Lungs clear to auscultation, no wheezing or rhonchi, no use of accessory muscles  Chest wall: No scars, normal excursion with respiration  Cardiovascular: Tachycardic, Irregular rhythm without murmur  Gastrointestinal: Abdomen soft, nontender. Bowel sounds normal. Obese  Musculoskeletal: Strength equal in upper extremities  Extremities: Trace pitting edema  Neurologic: Sensation grossly intact, alert and oriented x3    Intake/Output:   No intake/output data recorded.    Outpatient " Medications  Current Outpatient Medications on File Prior to Visit   Medication Sig Dispense Refill    albuterol 2.5 mg /3 mL (0.083 %) nebulizer solution Take 3 mL (2.5 mg) by nebulization every 4 hours if needed.      albuterol 90 mcg/actuation inhaler Inhale 2 puffs every 4 hours if needed for wheezing or shortness of breath. 18 g 1    amiodarone (Pacerone) 200 mg tablet TAKE 1 TABLET BY MOUTH DAILY 90 tablet 1    azelastine (Astelin) 137 mcg (0.1 %) nasal spray Administer 2 sprays into each nostril 2 times a day.      carvedilol (Coreg) 25 mg tablet Take 1 tablet (25 mg) by mouth 2 times a day with meals. 180 tablet 1    Eliquis 5 mg tablet Take 1 tablet (5 mg) by mouth 2 times a day. 180 tablet 1    fluticasone (Flonase) 50 mcg/actuation nasal spray USE 2 SPRAYS IN EACH NOSTRIL EVERY MORNING 48 mL 1    lisinopril 20 mg tablet Take 1 tablet (20 mg) by mouth 2 times a day.      lisinopril 20 mg tablet TAKE 1 TABLET BY MOUTH TWO TIMES A  tablet 1    spironolactone (Aldactone) 25 mg tablet Take 1 tablet (25 mg) by mouth once daily.      spironolactone (Aldactone) 25 mg tablet TAKE 1 TABLET DAILY. 90 tablet 1    Wixela Inhub 250-50 mcg/dose diskus inhaler Inhale 1 puff 2 times a day. 60 each 5     No current facility-administered medications on file prior to visit.       Labs: (past 26 weeks)  Recent Results (from the past 4368 hour(s))   Troponin I, High Sensitivity    Collection Time: 08/03/23 12:18 PM   Result Value Ref Range    Troponin I 3 0 - 20 ng/L   B-Type Natriuretic Peptide    Collection Time: 08/03/23 12:18 PM   Result Value Ref Range    BNP 79 0 - 99 pg/mL   CBC and Auto Differential    Collection Time: 08/03/23 12:18 PM   Result Value Ref Range    WBC 12.1 (H) 4.4 - 11.3 x10E9/L    RBC 4.85 4.50 - 5.90 x10E12/L    Hemoglobin 14.8 13.5 - 17.5 g/dL    Hematocrit 43.9 41.0 - 52.0 %    MCV 91 80 - 100 fL    MCHC 33.7 32.0 - 36.0 g/dL    Platelets 315 150 - 450 x10E9/L    RDW 11.5 11.5 - 14.5 %     Neutrophils % 63.5 40.0 - 80.0 %    Immature Granulocytes %, Automated 0.5 0.0 - 0.9 %    Lymphocytes % 17.2 13.0 - 44.0 %    Monocytes % 12.2 2.0 - 10.0 %    Eosinophils % 6.1 0.0 - 6.0 %    Basophils % 0.5 0.0 - 2.0 %    Neutrophils Absolute 7.68 1.20 - 7.70 x10E9/L    Lymphocytes Absolute 2.08 1.20 - 4.80 x10E9/L    Monocytes Absolute 1.48 (H) 0.10 - 1.00 x10E9/L    Eosinophils Absolute 0.74 (H) 0.00 - 0.70 x10E9/L    Basophils Absolute 0.06 0.00 - 0.10 x10E9/L   Thyroid Stimulating Hormone    Collection Time: 08/03/23 12:18 PM   Result Value Ref Range    TSH <0.01 (L) 0.44 - 3.98 mIU/L   Magnesium    Collection Time: 08/03/23 12:18 PM   Result Value Ref Range    Magnesium CANCELED    Comprehensive Metabolic Panel    Collection Time: 08/03/23 12:18 PM   Result Value Ref Range    Glucose CANCELED     Sodium CANCELED     Potassium CANCELED     Chloride CANCELED     Bicarbonate CANCELED     Anion Gap CANCELED     Urea Nitrogen CANCELED     Creatinine CANCELED     GFR Female CANCELED     GFR MALE CANCELED     Calcium CANCELED     Albumin CANCELED     Alkaline Phosphatase CANCELED     Total Protein CANCELED     AST CANCELED     Total Bilirubin CANCELED     ALT (SGPT) CANCELED    Thyroxine, Free    Collection Time: 08/03/23 12:18 PM   Result Value Ref Range    Free T4 5.25 (H) 0.61 - 1.12 ng/dL   Sars-CoV-2 PCR, Symptomatic    Collection Time: 08/03/23 12:23 PM   Result Value Ref Range    SARS-CoV-2 Result CANCELED    Sars-CoV-2 PCR, Symptomatic    Collection Time: 08/03/23 12:31 PM   Result Value Ref Range    SARS-CoV-2 Result CANCELED    Protime-INR    Collection Time: 08/03/23 12:32 PM   Result Value Ref Range    Protime 17.8 (H) 9.8 - 12.8 sec    INR 1.6 (H) 0.9 - 1.1   Sars-CoV-2 PCR, Symptomatic    Collection Time: 08/03/23 12:34 PM   Result Value Ref Range    SARS-CoV-2 Result NOT DETECTED Not Detected   Comprehensive Metabolic Panel    Collection Time: 08/03/23  1:40 PM   Result Value Ref Range    Glucose 94 74  - 99 mg/dL    Sodium 133 (L) 136 - 145 mmol/L    Potassium 5.0 3.5 - 5.3 mmol/L    Chloride 104 98 - 107 mmol/L    Bicarbonate 18 (L) 21 - 32 mmol/L    Anion Gap 16 10 - 20 mmol/L    Urea Nitrogen 45 (H) 6 - 23 mg/dL    Creatinine 1.65 (H) 0.50 - 1.30 mg/dL    GFR MALE 50 (A) >90 mL/min/1.73m2    Calcium 9.8 8.6 - 10.3 mg/dL    Albumin 3.9 3.4 - 5.0 g/dL    Alkaline Phosphatase 54 33 - 120 U/L    Total Protein 6.9 6.4 - 8.2 g/dL    AST 15 9 - 39 U/L    Total Bilirubin 0.5 0.0 - 1.2 mg/dL    ALT (SGPT) 12 10 - 52 U/L   Troponin I, High Sensitivity    Collection Time: 08/03/23  1:40 PM   Result Value Ref Range    Troponin I <3 0 - 20 ng/L   Magnesium    Collection Time: 08/03/23  1:40 PM   Result Value Ref Range    Magnesium 1.89 1.60 - 2.40 mg/dL   Comprehensive Metabolic Panel    Collection Time: 08/03/23  1:40 PM   Result Value Ref Range    Glucose CANCELED     Sodium CANCELED     Potassium CANCELED     Chloride CANCELED     Bicarbonate CANCELED     Anion Gap CANCELED     Urea Nitrogen CANCELED     Creatinine CANCELED     GFR Female CANCELED     GFR MALE CANCELED     Calcium CANCELED     Albumin CANCELED     Alkaline Phosphatase CANCELED     Total Protein CANCELED     AST CANCELED     Total Bilirubin CANCELED     ALT (SGPT) CANCELED    Urinalysis with Reflex Microscopic and Culture    Collection Time: 08/03/23  1:53 PM   Result Value Ref Range    Color, Urine Yellow STRAW,YELLOW    Appearance, Urine Hazy CLEAR    Specific Gravity, Urine 1.026 1.005 - 1.035    pH, Urine 5.0 5.0 - 8.0    Protein, Urine 30(1+) (A) NEGATIVE mg/dL    Glucose, Urine Negative NEGATIVE mg/dL    Blood, Urine Negative NEGATIVE    Ketones, Urine Negative NEGATIVE mg/dL    Bilirubin, Urine Negative NEGATIVE    Urobilinogen, Urine <2.0 0.0 - 1.9 mg/dL    Nitrite, Urine Negative NEGATIVE    Leukocyte Esterase, Urine Negative NEGATIVE   Urinalysis Microscopic Only    Collection Time: 08/03/23  1:53 PM   Result Value Ref Range    WBC, Urine 3 0 -  5 /HPF    RBC, Urine 1 0 - 5 /HPF    Squamous Epithelial Cells, Urine <1 /HPF    Mucus, Urine 1+ /LPF    Hyaline Casts, Urine 4+ (A) /LPF   Troponin I, High Sensitivity    Collection Time: 08/03/23  3:24 PM   Result Value Ref Range    Troponin I 4 0 - 20 ng/L   Thyroxine binding globulin    Collection Time: 08/10/23 12:16 PM   Result Value Ref Range    Thyroxine Binding Globulin 14.8 13.0 - 30.0 ug/mL   T3, free    Collection Time: 08/10/23 12:16 PM   Result Value Ref Range    T3, Free >10.0 (H) 2.3 - 4.2 pg/mL   T4, free    Collection Time: 08/10/23 12:16 PM   Result Value Ref Range    Free T4 5.51 (H) 0.61 - 1.12 ng/dL   TSH    Collection Time: 08/10/23 12:16 PM   Result Value Ref Range    TSH <0.01 (L) 0.44 - 3.98 mIU/L   Thyroid stimulating immunoglobulin    Collection Time: 08/10/23 12:16 PM   Result Value Ref Range    TSI <1.0 <=1.3 TSI index   Basic Metabolic Panel    Collection Time: 08/10/23 12:16 PM   Result Value Ref Range    Glucose 90 74 - 99 mg/dL    Sodium 139 136 - 145 mmol/L    Potassium 5.2 3.5 - 5.3 mmol/L    Chloride 106 98 - 107 mmol/L    Bicarbonate 22 21 - 32 mmol/L    Anion Gap 16 10 - 20 mmol/L    Urea Nitrogen 33 (H) 6 - 23 mg/dL    Creatinine 1.07 0.50 - 1.30 mg/dL    GFR MALE 84 >90 mL/min/1.73m2    Calcium 10.2 8.6 - 10.3 mg/dL   CBC and Auto Differential    Collection Time: 08/10/23 12:16 PM   Result Value Ref Range    WBC 8.5 4.4 - 11.3 x10E9/L    RBC 4.86 4.50 - 5.90 x10E12/L    Hemoglobin 14.5 13.5 - 17.5 g/dL    Hematocrit 45.9 41.0 - 52.0 %    MCV 94 80 - 100 fL    MCHC 31.6 (L) 32.0 - 36.0 g/dL    Platelets 393 150 - 450 x10E9/L    RDW 11.2 (L) 11.5 - 14.5 %    Neutrophils % 55.1 40.0 - 80.0 %    Immature Granulocytes %, Automated 0.4 0.0 - 0.9 %    Lymphocytes % 23.4 13.0 - 44.0 %    Monocytes % 13.2 2.0 - 10.0 %    Eosinophils % 7.2 0.0 - 6.0 %    Basophils % 0.7 0.0 - 2.0 %    Neutrophils Absolute 4.66 1.20 - 7.70 x10E9/L    Lymphocytes Absolute 1.98 1.20 - 4.80 x10E9/L     Monocytes Absolute 1.12 (H) 0.10 - 1.00 x10E9/L    Eosinophils Absolute 0.61 0.00 - 0.70 x10E9/L    Basophils Absolute 0.06 0.00 - 0.10 x10E9/L   Basic Metabolic Panel    Collection Time: 09/14/23 10:46 AM   Result Value Ref Range    Glucose 85 74 - 99 mg/dL    Sodium 139 136 - 145 mmol/L    Potassium 4.7 3.5 - 5.3 mmol/L    Chloride 107 98 - 107 mmol/L    Bicarbonate 22 21 - 32 mmol/L    Anion Gap 15 10 - 20 mmol/L    Urea Nitrogen 21 6 - 23 mg/dL    Creatinine 1.05 0.50 - 1.30 mg/dL    GFR MALE 86 >90 mL/min/1.73m2    Calcium 9.5 8.6 - 10.3 mg/dL   CBC    Collection Time: 09/14/23 10:46 AM   Result Value Ref Range    WBC 8.5 4.4 - 11.3 x10E9/L    RBC 4.92 4.50 - 5.90 x10E12/L    Hemoglobin 14.5 13.5 - 17.5 g/dL    Hematocrit 44.5 41.0 - 52.0 %    MCV 90 80 - 100 fL    MCHC 32.6 32.0 - 36.0 g/dL    Platelets 309 150 - 450 x10E9/L    RDW 12.8 11.5 - 14.5 %   Electrocardiogram 12 Lead    Collection Time: 09/14/23 12:32 PM   Result Value Ref Range    Ventricular Rate 88 BPM    Atrial Rate 88 BPM    TX Interval 174 ms    QRS Duration 88 ms    QT Interval 366 ms    QTC Calculation(Bazett) 442 ms    P Axis 39 degrees    R Axis 16 degrees    T Axis 22 degrees    QRS Count 14 beats    Q Onset 217 ms    P Onset 130 ms    P Offset 190 ms    T Offset 400 ms    QTC Fredericia 416 ms       ECG  No results found for this or any previous visit (from the past 4464 hour(s)).    Echocardiogram  No results found for this or any previous visit from the past 1095 days.      CV Studies:  EKG: No results found for this or any previous visit (from the past 4464 hour(s)).  Echocardiogram:   Echocardiogram     [place] [id number]  Phone [phone] Fax [phone]    TRANSTHORACIC ECHOCARDIOGRAM REPORT      Patient Name:     BRAYAN HICKEY   Reading Physician:  [id number] Sameer Malin MD  III  Study Date:       9/19/2023            Referring           KRISTI  OLIVIA  Physician:  MRN/PID:          [id number]             PCP:  Accession/Order#: [id number]         [facility] Echo  Location:           Lab  YOB: 1975            Fellow:  Gender:           M                    Nurse:  Admit Date:                            Sonographer:        [facility]  Admission Status: Outpatient           Additional Staff:  Height:           187.00 cm            CC Report to:  Weight:           131.00 kg            Study Type:         Echocardiogram  BSA:              2.53 m2  Blood Pressure: 132 /91 mmHg    Diagnosis/ICD: I42.9-Cardiomyopathy, unspecified; I48.0-Paroxysmal atrial  fibrillation  Indication:    Cardiomyopathy, Afib  Procedure/CPT: Echo Complete w Full [id number]    Patient History:  Pertinent History: A-Fib.    Study Detail: The following Echo studies were performed: 2D, M-Mode, Doppler and  color flow. Technically challenging study due to body habitus and  prominent lung artifact.      PHYSICIAN INTERPRETATION:  Left Ventricle: Left ventricular systolic function is mildly decreased, with an estimated ejection fraction of 45-50%. There is global hypokinesis of the left ventricle with minor regional variations. The left ventricular cavity size is normal. There is mild to moderate left ventricular hypertrophy. Left ventricular diastolic filling was indeterminate.  Left Atrium: The left atrium is mild to moderately dilated.  Right Ventricle: The right ventricle is slightly enlarged. There is normal right ventricular global systolic function.  Right Atrium: The right atrium is mildly dilated.  Aortic Valve: The aortic valve appears structurally normal. There is no evidence of aortic valve regurgitation.  Mitral Valve: The mitral valve is normal in structure. There is trace mitral valve regurgitation.  Tricuspid Valve: The tricuspid valve is structurally normal. No evidence of tricuspid regurgitation.  Pulmonic Valve: The  pulmonic valve is structurally normal. There is no indication of pulmonic valve regurgitation.  Pericardium: There is no pericardial effusion noted.  Aorta: The aortic root is normal. There is mild dilatation of the ascending aorta.      CONCLUSIONS:  1. Left ventricular systolic function is mildly decreased with a 45-50% estimated ejection fraction.  2. The left atrium is mild to moderately dilated.  3. There is global hypokinesis of the left ventricle with minor regional variations.    QUANTITATIVE DATA SUMMARY:  2D MEASUREMENTS:  Normal Ranges:  Ao Root d:     3.50 cm   (2.0-3.7cm)  LAs:           3.98 cm   (2.7-4.0cm)  IVSd:          1.45 cm   (0.6-1.1cm)  LVPWd:         1.43 cm   (0.6-1.1cm)  LVIDd:         3.82 cm   (3.9-5.9cm)  LVIDs:         2.93 cm  LV Mass Index: 80.6 g/m2  LV % FS        23.2 %    LA VOLUME:  Normal Ranges:  LA Vol A4C:        74.8 ml    (22+/-6mL/m2)  LA Vol A2C:        93.9 ml  LA Vol BP:         87.5 ml  LA Vol Index A4C:  29.6ml/m2  LA Vol Index A2C:  37.1 ml/m2  LA Vol Index BP:   34.6 ml/m2  LA Area A4C:       24.1 cm2  LA Area A2C:       28.2 cm2  LA Major Axis A4C: 6.6 cm  LA Major Axis A2C: 7.2 cm  LA Vol A4C:        72.0 ml  LA Vol A2C:        92.5 ml    RA VOLUME BY A/L METHOD:  Normal Ranges:  RA Area A4C: 21.6 cm2    AORTA MEASUREMENTS:  Normal Ranges:  Ao STJ, d: 3.10 cm (1.7-3.4cm)  Asc Ao, d: 4.00 cm (2.1-3.4cm)    LV SYSTOLIC FUNCTION BY 2D PLANIMETRY (MOD):  Normal Ranges:  EF-A4C View: 49.3 % (>=55%)  EF-A2C View: 48.1 %  EF-Biplane:  48.0 %    LV DIASTOLIC FUNCTION:  Normal Ranges:  MV Peak E:    0.66 m/s (0.7-1.2 m/s)  MV Peak A:    0.01 m/s (0.42-0.7 m/s)  E/A Ratio:    73.44    (1.0-2.2)  MV e'         0.16 m/s (>8.0)  MV lateral e' 0.18 m/s  MV medial e'  0.13 m/s  E/e' Ratio:   4.27     (<8.0)    MITRAL VALVE:  Normal Ranges:  MV DT: 154 msec (150-240msec)    AORTIC VALVE:  Normal Ranges:  LVOT Max Jorge:  0.94 m/s (<=1.1m/s)  LVOT VTI:      15.24 cm  LVOT Diameter:  2.41 cm  (1.8-2.4cm)      RIGHT VENTRICLE:  RV Basal 4.25 cm  RV Mid   3.70 cm  RV Major 7.3 cm  TAPSE:   17.4 mm  RV s'    0.16 m/s    TRICUSPID VALVE/RVSP:  Normal Ranges:  IVC Diam: 1.83 cm  TV e'     0.1 m/s    AORTA:  Asc Ao Diam 4.03 cm      [id number] Sameer Malin MD  Electronically signed on 9/19/2023 at 2:02:54 PM         Final     Stress Testing IMGRESULT([id number]): No results found for this or any previous visit from the past 1825 days.    Cardiac Catheterization: No results found for this or any previous visit from the past 1825 days.  No results found for this or any previous visit from the past 3650 days.     Cardiac Scoring: No results found for this or any previous visit from the past 1825 days.    AAA : No results found for this or any previous visit from the past 1825 days.    OTHER: No results found for this or any previous visit from the past 1825 days.    LAST IMAGING RESULTS  [place] [address]  Phone [phone] Fax [phone]    TRANSTHORACIC ECHOCARDIOGRAM REPORT    Patient Name:     BRAYAN HICKEY   Reading Physician:  [id number] Sameer Malin MD  III  Study Date:       9/19/2023            Referring           KRISTI BAKER  Physician:  MRN/PID:          [id number]             PCP:  Accession/Order#: [id number]         [facility] Echo  Location:           Lab  YOB: 1975            Fellow:  Gender:           M                    Nurse:  Admit Date:                            Sonographer:        Sofia [facility]  Admission Status: Outpatient           Additional Staff:  Height:           187.00 cm            CC Report to:  Weight:           131.00 kg            Study Type:         Echocardiogram  BSA:              2.53 m2  Blood Pressure: 132 /91 mmHg    Diagnosis/ICD: I42.9-Cardiomyopathy, unspecified; I48.0-Paroxysmal atrial  fibrillation  Indication:    Cardiomyopathy,  Afib  Procedure/CPT: Echo Complete w Full [id number]    Patient History:  Pertinent History: A-Fib.    Study Detail: The following Echo studies were performed: 2D, M-Mode, Doppler and  color flow. Technically challenging study due to body habitus and  prominent lung artifact.    PHYSICIAN INTERPRETATION:  Left Ventricle: Left ventricular systolic function is mildly decreased, with an estimated ejection fraction of 45-50%. There is global hypokinesis of the left ventricle with minor regional variations. The left ventricular cavity size is normal. There is mild to moderate left ventricular hypertrophy. Left ventricular diastolic filling was indeterminate.  Left Atrium: The left atrium is mild to moderately dilated.  Right Ventricle: The right ventricle is slightly enlarged. There is normal right ventricular global systolic function.  Right Atrium: The right atrium is mildly dilated.  Aortic Valve: The aortic valve appears structurally normal. There is no evidence of aortic valve regurgitation.  Mitral Valve: The mitral valve is normal in structure. There is trace mitral valve regurgitation.  Tricuspid Valve: The tricuspid valve is structurally normal. No evidence of tricuspid regurgitation.  Pulmonic Valve: The pulmonic valve is structurally normal. There is no indication of pulmonic valve regurgitation.  Pericardium: There is no pericardial effusion noted.  Aorta: The aortic root is normal. There is mild dilatation of the ascending aorta.    CONCLUSIONS:  1. Left ventricular systolic function is mildly decreased with a 45-50% estimated ejection fraction.  2. The left atrium is mild to moderately dilated.  3. There is global hypokinesis of the left ventricle with minor regional variations.    QUANTITATIVE DATA SUMMARY:  2D MEASUREMENTS:  Normal Ranges:  Ao Root d:     3.50 cm   (2.0-3.7cm)  LAs:           3.98 cm   (2.7-4.0cm)  IVSd:          1.45 cm   (0.6-1.1cm)  LVPWd:         1.43 cm   (0.6-1.1cm)  LVIDd:          3.82 cm   (3.9-5.9cm)  LVIDs:         2.93 cm  LV Mass Index: 80.6 g/m2  LV % FS        23.2 %    LA VOLUME:  Normal Ranges:  LA Vol A4C:        74.8 ml    (22+/-6mL/m2)  LA Vol A2C:        93.9 ml  LA Vol BP:         87.5 ml  LA Vol Index A4C:  29.6ml/m2  LA Vol Index A2C:  37.1 ml/m2  LA Vol Index BP:   34.6 ml/m2  LA Area A4C:       24.1 cm2  LA Area A2C:       28.2 cm2  LA Major Axis A4C: 6.6 cm  LA Major Axis A2C: 7.2 cm  LA Vol A4C:        72.0 ml  LA Vol A2C:        92.5 ml    RA VOLUME BY A/L METHOD:  Normal Ranges:  RA Area A4C: 21.6 cm2    AORTA MEASUREMENTS:  Normal Ranges:  Ao STJ, d: 3.10 cm (1.7-3.4cm)  Asc Ao, d: 4.00 cm (2.1-3.4cm)    LV SYSTOLIC FUNCTION BY 2D PLANIMETRY (MOD):  Normal Ranges:  EF-A4C View: 49.3 % (>=55%)  EF-A2C View: 48.1 %  EF-Biplane:  48.0 %    LV DIASTOLIC FUNCTION:  Normal Ranges:  MV Peak E:    0.66 m/s (0.7-1.2 m/s)  MV Peak A:    0.01 m/s (0.42-0.7 m/s)  E/A Ratio:    73.44    (1.0-2.2)  MV e'         0.16 m/s (>8.0)  MV lateral e' 0.18 m/s  MV medial e'  0.13 m/s  E/e' Ratio:   4.27     (<8.0)    MITRAL VALVE:  Normal Ranges:  MV DT: 154 msec (150-240msec)    AORTIC VALVE:  Normal Ranges:  LVOT Max Jorge:  0.94 m/s (<=1.1m/s)  LVOT VTI:      15.24 cm  LVOT Diameter: 2.41 cm  (1.8-2.4cm)    RIGHT VENTRICLE:  RV Basal 4.25 cm  RV Mid   3.70 cm  RV Major 7.3 cm  TAPSE:   17.4 mm  RV s'    0.16 m/s    TRICUSPID VALVE/RVSP:  Normal Ranges:  IVC Diam: 1.83 cm  TV e'     0.1 m/s    AORTA:  Asc Ao Diam 4.03 cm    [id number] Sameer Malin MD  Electronically signed on 9/19/2023 at 2:02:54 PM    *** Final ***    Problem List Items Addressed This Visit       Benign essential hypertension (Chronic)    Cardiomyopathy (CMS/HCC) (Chronic)    Obstructive sleep apnea syndrome    Paroxysmal atrial fibrillation (CMS/HCC) - Primary (Chronic)    On apixaban therapy    Long term current use of amiodarone    Obesity (BMI 30-39.9)          Tony Mckeon DO, FACC, FACOI

## 2024-01-25 DIAGNOSIS — I48.91 ATRIAL FIBRILLATION, UNSPECIFIED TYPE (MULTI): ICD-10-CM

## 2024-01-26 RX ORDER — CARVEDILOL 25 MG/1
TABLET ORAL
Qty: 180 TABLET | Refills: 3 | Status: SHIPPED | OUTPATIENT
Start: 2024-01-26

## 2024-01-29 ENCOUNTER — TELEPHONE (OUTPATIENT)
Dept: CARDIOLOGY | Facility: CLINIC | Age: 49
End: 2024-01-29

## 2024-01-29 ENCOUNTER — APPOINTMENT (OUTPATIENT)
Dept: CARDIOLOGY | Facility: CLINIC | Age: 49
End: 2024-01-29
Payer: COMMERCIAL

## 2024-01-29 DIAGNOSIS — I10 BENIGN ESSENTIAL HYPERTENSION: Chronic | ICD-10-CM

## 2024-01-29 DIAGNOSIS — I48.91 ATRIAL FIBRILLATION, UNSPECIFIED TYPE (MULTI): ICD-10-CM

## 2024-01-29 DIAGNOSIS — I48.0 PAROXYSMAL ATRIAL FIBRILLATION (MULTI): Chronic | ICD-10-CM

## 2024-01-29 DIAGNOSIS — I50.42 CHRONIC COMBINED SYSTOLIC AND DIASTOLIC HEART FAILURE (MULTI): Primary | Chronic | ICD-10-CM

## 2024-01-29 RX ORDER — AMIODARONE HYDROCHLORIDE 200 MG/1
200 TABLET ORAL DAILY
Qty: 90 TABLET | Refills: 1 | Status: SHIPPED | OUTPATIENT
Start: 2024-01-29 | End: 2025-01-28

## 2024-01-29 RX ORDER — LISINOPRIL 20 MG/1
20 TABLET ORAL 2 TIMES DAILY
Qty: 180 TABLET | Refills: 1 | Status: SHIPPED | OUTPATIENT
Start: 2024-01-29 | End: 2025-01-28

## 2024-01-29 RX ORDER — SPIRONOLACTONE 25 MG/1
25 TABLET ORAL DAILY
Qty: 90 TABLET | Refills: 1 | Status: SHIPPED | OUTPATIENT
Start: 2024-01-29

## 2024-01-29 NOTE — TELEPHONE ENCOUNTER
1/29/24  1109  Called patient; no answer. Left voice message that message received for medication renewals, that patient needs to keep appt with endocrine and to call if questions.    ----- Message from JOSE Butt sent at 1/29/2024 10:46 AM EST -----  Regarding: Endocrine fup  Thyroid function abn on Amio labs.  Please make sure that patient keeps his upcoming Endocrine appointment. thanks

## 2024-01-30 ENCOUNTER — PHARMACY VISIT (OUTPATIENT)
Dept: PHARMACY | Facility: CLINIC | Age: 49
End: 2024-01-30
Payer: COMMERCIAL

## 2024-01-30 PROCEDURE — RXMED WILLOW AMBULATORY MEDICATION CHARGE

## 2024-02-01 ENCOUNTER — APPOINTMENT (OUTPATIENT)
Dept: ENDOCRINOLOGY | Facility: CLINIC | Age: 49
End: 2024-02-01
Payer: COMMERCIAL

## 2024-02-01 NOTE — PROGRESS NOTES
"Subjective   Energy levels -    Sleep -    Temperature intolerances -    Bowel movements -    Hair changes -    Skin changes -    Palpitations -   Diaphoresis -    Tremors -  Mood -  Dysphagia -  Dyspnea upon lying supine -    Dysphonia -    Weight changes -    Denies radiation exposure to her head or neck as a child   Menstrual history -    Negative family history for thyroid cancer   Never smoker          Review of Systems    Objective   There were no vitals taken for this visit.   Physical Exam  Vitals and nursing note reviewed.   Constitutional:       General: He is not in acute distress.     Appearance: Normal appearance. He is normal weight.   HENT:      Head: Normocephalic and atraumatic.      Nose: Nose normal.      Mouth/Throat:      Mouth: Mucous membranes are moist.   Eyes:      Extraocular Movements: Extraocular movements intact.   Cardiovascular:      Rate and Rhythm: Normal rate and regular rhythm.   Pulmonary:      Effort: Pulmonary effort is normal.      Breath sounds: Normal breath sounds.   Musculoskeletal:         General: Normal range of motion.   Skin:     General: Skin is warm.   Neurological:      Mental Status: He is alert and oriented to person, place, and time.   Psychiatric:         Mood and Affect: Mood normal.         Lab Results   Component Value Date    TSH <0.01 (L) 08/10/2023    FREET4 5.51 (H) 08/10/2023       Assessment/Plan   There are no diagnoses linked to this encounter.    Hypothyroidism. This diagnosis was discussed and reviewed with the patient including the advantages of drug therapy.  {hypothyroid plan:58059}  Repeat s-TSH in 6-8 weeks.  The risks and benefits of my recommendations, as well as other treatment options, were discussed with the {:5061::\"patient\"} today. Questions were answered.  Follow up: {0-10:20590} {time units:11} and as needed.  "

## 2024-03-12 PROCEDURE — RXMED WILLOW AMBULATORY MEDICATION CHARGE

## 2024-03-15 ENCOUNTER — PHARMACY VISIT (OUTPATIENT)
Dept: PHARMACY | Facility: CLINIC | Age: 49
End: 2024-03-15
Payer: COMMERCIAL

## 2024-04-30 PROCEDURE — RXMED WILLOW AMBULATORY MEDICATION CHARGE

## 2024-05-05 ENCOUNTER — PHARMACY VISIT (OUTPATIENT)
Dept: PHARMACY | Facility: CLINIC | Age: 49
End: 2024-05-05
Payer: COMMERCIAL

## 2024-05-07 NOTE — PROGRESS NOTES
[facility] Heart and Vascular Cardiology    Patient Name: Donavan Ordaz III  Patient : 1975      Scribe Attestation  By signing my name below, ITonia Scribe   attest that this documentation has been prepared under the direction and in the presence of Tony Mckeon DO.      Reason for visit:  This is a 48-year-old male here for follow-up regarding paroxysmal atrial fibrillation, anticoagulation with apixaban, antiarrhythmic drug therapy with amiodarone, cardiomyopathy with ejection fraction zully of 20% now 45%, hypertension, obstructive sleep apnea, and obesity.    HPI:  This is a 48-year-old male here for follow-up regarding paroxysmal atrial fibrillation, anticoagulation with apixaban, antiarrhythmic drug therapy with amiodarone, cardiomyopathy with ejection fraction zully of 20% now 45%, hypertension, obstructive sleep apnea, and obesity.  The patient was last evaluated by me in 2023.  At that visit I had ordered an echocardiogram, referred the patient to EP cardiology for evaluation of ablation, referred the patient to endocrinology, scheduled him for an electrical cardioversion, and asked that he follow-up in 1 month.  The patient underwent successful cardioversion on 2023.  CBC done in 2023 showed a hemoglobin of 14.5, BMP showed normal serum sodium and potassium with a serum creatinine of 1.05.  TSH done in August was less than 0.01.  Echocardiogram done in 2023 showed mildly reduced left ventricular systolic function with an ejection fraction of 45 to 50%, dilated right ventricle with normal right ventricular systolic function, biatrial dilatation, mild dilatation of the ascending aorta measured at 4.0 cm. ECG done today showed ***            Assessment/Plan:   1. Paroxysmal atrial fibrillation  The patient has a history of paroxysmal atrial fibrillation on apixaban for thromboembolism prophylaxis, which should be continued.  He should  continue carvedilol for heart rate control.  He should continue amiodarone to reduce his burden of atrial fibrillation.  ECG done today showed ***  He denies chest pain, palpitations or lightheadedness.   Echocardiogram done in September 2023 showed mildly reduced left ventricular systolic function with an ejection fraction of 45 to 50%, dilated right ventricle with normal right ventricular systolic function, biatrial dilatation, mild dilatation of the ascending aorta measured at 4.0 cm.   Recent lab works as noted in the HPI.   Discussed different rhythm control approaches including cardioversion and ablation. He reports that he had not missed a dose of his apixaban. He expressed understanding and agreed to undergo cardioversion.  I will set him up for cardioversion. ***  I will refer him back to EP cardiology for evaluation of ablation. ***  I will refer him to Endocrinology for management of abnormal thyroid function.  Lab works as noted below will be done in 3 months prior to his next visit.   Follow up in 3 months and sooner if necessary.      2. Anticoagulation with apixaban  The patient is on anticoagulation with apixaban for paroxysmal atrial fibrillation.  Recent lab works as noted in the HPI.     Lab works as noted below will be done in 3 months prior to his next visit.      3. Antiarrhythmic drug therapy with amiodarone  The patient is on antiarrhythmic drug therapy with amiodarone for paroxysmal atrial fibrillation.  Recent lab works as noted in the HPI.  Lab works as noted below will be done in 3 months prior to his next visit.   We will refer patient to EP cardiology for possible ablation as I would like to get him off of amiodarone given his abnormal thyroid function studies. ***     4. Cardiomyopathy  The patient has a history of cardiomyopathy with ejection fraction zully of 20% now 45%.  Echocardiogram done in September 2023 showed mildly reduced left ventricular systolic function with an ejection  fraction of 45 to 50%, dilated right ventricle with normal right ventricular systolic function, biatrial dilatation, mild dilatation of the ascending aorta measured at 4.0 cm.   He does not appear significantly volume overloaded on exam today.  He should continue his current cardiac medications.  Recent lab works as noted in the HPI.  Lab works as noted below will be done in 3 months prior to his next visit.   I discussed with him the importance of following a low-sodium heart healthy diet as well as weight loss.   Follow up in 3 months and sooner if necessary.      5. Hypertension  Patient has a history of hypertension which appears controlled on exam today.  He should continue his current antihypertensive medications.      6. Obstructive sleep apnea  Management as per PCP.     7. Obesity  Please see lifestyle recommendations below.      Orders:   Established with EP cardiology/endocrinology?, ***  BMP/BNP/magnesium in 3 months,   Follow-up in 3 months    Lifestyle Recommendations  I recommend a whole-food plant-based diet, an eating pattern that encourages the consumption of unrefined plant foods (such as fruits, vegetables, tubers, whole grains, legumes, nuts and seeds) and discourages meats, dairy products, eggs and processed foods.     The AHA/ACC recommends that the patient consume a dietary pattern that emphasizes intake of vegetables, fruits, and whole grains; includes low-fat dairy products, poultry, fish, legumes, non-tropical vegetable oils, and nuts; and limits intake of sodium, sweets, sugar-sweetened beverages, and red meats.  Adapt this dietary pattern to appropriate calorie requirements (a 500-750 kcal/day deficit to loose weight), personal and cultural food preferences, and nutrition therapy for other medical conditions (including diabetes).  Achieve this pattern by following plans such as the [place] Mediterranean, DASH dietary pattern, or AHA diet.     Engage in 2 hours and 30 minutes per week of  moderate-intensity physical activity, or 1 hour and 15 minutes (75 minutes) per week of vigorous-intensity aerobic physical activity, or an equivalent combination of moderate and vigorous-intensity aerobic physical activity. Aerobic activity should be performed in episodes of at least 10 minutes preferably spread throughout the week.     Adhering to a heart healthy diet, regular exercise habits, avoidance of tobacco products, and maintenance of a healthy weight are crucial components of their heart disease risk reduction.     Any positive review of systems not specifically addressed in the office visit today should be evaluated and treated by the patients primary care physician or in an emergency department if necessary     Patient was notified that results from ordered tests will be called to the patient if it changes current management; it will otherwise be discussed at a future appointment and available on  [email or web address].     Thank you for allowing me to participate in the care of this patient.        This document was generated using the assistance of voice recognition software. If there are any errors of spelling, grammar, syntax, or meaning; please feel free to contact me directly for clarification.    Past Medical History:  He has a past medical history of Personal history of other diseases of the respiratory system (01/03/2018), Personal history of other diseases of the respiratory system (10/30/2019), and Unspecified foreign body in bronchus causing asphyxiation, initial encounter (10/01/2020).    Past Surgical History:  He has a past surgical history that includes Other surgical history (05/21/2020) and Other surgical history (05/20/2020).      Social History:  He reports that he has never smoked. He has never used smokeless tobacco. He reports that he does not currently use alcohol. He reports that he does not use drugs.    Family History:  Family History   Problem Relation Name Age of Onset    Hypertension  "Mother          Allergies:  Patient has no known allergies.    Outpatient Medications:  Current Outpatient Medications   Medication Instructions    albuterol 90 mcg/actuation inhaler 2 puffs, inhalation, Every 4 hours PRN    albuterol 2.5 mg, nebulization, Every 4 hours PRN    amiodarone (Pacerone) 200 mg tablet TAKE 1 TABLET BY MOUTH DAILY    azelastine (Astelin) 137 mcg (0.1 %) nasal spray 2 sprays, Each Nostril, 2 times daily    carvedilol (Coreg) 25 mg tablet TAKE 1 TABLET (25 MG) BY MOUTH TWICE A DAY WITH MEALS    Eliquis 5 mg, oral, 2 times daily    fluticasone (Flonase) 50 mcg/actuation nasal spray 2 sprays, Each Nostril, Daily before breakfast    lisinopril 20 mg tablet 1 tablet, oral, 2 times daily    lisinopril 20 mg tablet TAKE 1 TABLET BY MOUTH TWO TIMES A DAY    spironolactone (Aldactone) 25 mg tablet TAKE 1 TABLET DAILY.    spironolactone (ALDACTONE) 25 mg, oral, Daily    Wixela Inhub 250-50 mcg/dose diskus inhaler 1 puff, inhalation, 2 times daily RT        ROS:  A 14 point review of systems was done and is negative other than as stated in HPI    Vitals:      5/11/2021     2:34 PM 1/21/2022    11:04 AM 4/25/2022     2:48 PM 2/15/2023    12:39 PM 3/13/2023     9:30 AM 8/8/2023    11:52 AM 8/22/2023     4:05 PM   Vitals   Systolic 154 150 157 140  100 120   Diastolic 98 100 117 92  62 80   Heart Rate  58 70 78  69 114   Temp      36.2 °C (97.2 °F)    Resp  16 17       Height (in)  1.88 m (6' 2\") 1.88 m (6' 2\") 1.88 m (6' 2\") 1.905 m (6' 3\")  1.905 m (6' 3\")   Weight (lb)  294 307.44 323 323 288 283   BMI  37.75 kg/m2 39.47 kg/m2 41.47 kg/m2 40.37 kg/m2 36 kg/m2 35.37 kg/m2   BSA (m2)  2.64 m2 2.69 m2 2.77 m2 2.79 m2 2.63 m2 2.6 m2        Physical Exam:   ***  Constitutional: Cooperative, in no acute distress, alert, appears stated age.  Skin: Skin color, texture, turgor normal. No rashes or lesions.  Head: Normocephalic. No masses, lesions, tenderness or abnormalities  Eyes: Extraocular movements are " grossly intact.  Mouth and throat: Mucous membranes moist  Neck: Neck supple, no carotid bruits, no JVD  Respiratory: Lungs clear to auscultation, no wheezing or rhonchi, no use of accessory muscles  Chest wall: No scars, normal excursion with respiration  Cardiovascular: Tachycardic, Irregular rhythm without murmur  Gastrointestinal: Abdomen soft, nontender. Bowel sounds normal. Obese  Musculoskeletal: Strength equal in upper extremities  Extremities: Trace pitting edema  Neurologic: Sensation grossly intact, alert and oriented x3    Intake/Output:   No intake/output data recorded.    Outpatient Medications  Current Outpatient Medications on File Prior to Visit   Medication Sig Dispense Refill    albuterol 2.5 mg /3 mL (0.083 %) nebulizer solution Take 3 mL (2.5 mg) by nebulization every 4 hours if needed.      albuterol 90 mcg/actuation inhaler Inhale 2 puffs every 4 hours if needed for wheezing or shortness of breath. 18 g 1    amiodarone (Pacerone) 200 mg tablet TAKE 1 TABLET BY MOUTH DAILY 90 tablet 1    apixaban (Eliquis) 5 mg tablet Take 1 tablet (5 mg) by mouth 2 times a day. 180 tablet 3    azelastine (Astelin) 137 mcg (0.1 %) nasal spray Administer 2 sprays into each nostril 2 times a day.      carvedilol (Coreg) 25 mg tablet TAKE 1 TABLET (25 MG) BY MOUTH TWICE A DAY WITH MEALS 180 tablet 3    fluticasone (Flonase) 50 mcg/actuation nasal spray USE 2 SPRAYS IN EACH NOSTRIL EVERY MORNING 48 mL 1    lisinopril 20 mg tablet Take 1 tablet (20 mg) by mouth 2 times a day.      lisinopril 20 mg tablet TAKE 1 TABLET BY MOUTH TWO TIMES A  tablet 1    spironolactone (Aldactone) 25 mg tablet TAKE 1 TABLET DAILY. 90 tablet 1    spironolactone (Aldactone) 25 mg tablet Take 1 tablet (25 mg) by mouth once daily. 90 tablet 1    Wixela Inhub 250-50 mcg/dose diskus inhaler Inhale 1 puff 2 times a day. 60 each 5     No current facility-administered medications on file prior to visit.       Labs: (past 26 weeks)  No  results found for this or any previous visit (from the past 4368 hour(s)).    ECG  No results found for this or any previous visit (from the past 4464 hour(s)).    Echocardiogram  No results found for this or any previous visit from the past 1095 days.      CV Studies:  EKG: No results found for this or any previous visit (from the past 4464 hour(s)).  Echocardiogram:   Echocardiogram     [place] [id number]  Phone [phone] Fax [phone]    TRANSTHORACIC ECHOCARDIOGRAM REPORT      Patient Name:     BRAYAN CELESTEYESENIA HICKEY   Reading Physician:  [id number] Sameer Malin MD  III  Study Date:       9/19/2023            Referring           KRISTI BAKER  Physician:  MRN/PID:          [id number]             PCP:  Accession/Order#: [id number]         [facility] Echo  Location:           Lab  YOB: 1975            Fellow:  Gender:           M                    Nurse:  Admit Date:                            Sonographer:        [facility]  Admission Status: Outpatient           Additional Staff:  Height:           187.00 cm            CC Report to:  Weight:           131.00 kg            Study Type:         Echocardiogram  BSA:              2.53 m2  Blood Pressure: 132 /91 mmHg    Diagnosis/ICD: I42.9-Cardiomyopathy, unspecified; I48.0-Paroxysmal atrial  fibrillation  Indication:    Cardiomyopathy, Afib  Procedure/CPT: Echo Complete w Full [id number]    Patient History:  Pertinent History: A-Fib.    Study Detail: The following Echo studies were performed: 2D, M-Mode, Doppler and  color flow. Technically challenging study due to body habitus and  prominent lung artifact.      PHYSICIAN INTERPRETATION:  Left Ventricle: Left ventricular systolic function is mildly decreased, with an estimated ejection fraction of 45-50%. There is global hypokinesis of the left ventricle with minor regional variations. The left ventricular  cavity size is normal. There is mild to moderate left ventricular hypertrophy. Left ventricular diastolic filling was indeterminate.  Left Atrium: The left atrium is mild to moderately dilated.  Right Ventricle: The right ventricle is slightly enlarged. There is normal right ventricular global systolic function.  Right Atrium: The right atrium is mildly dilated.  Aortic Valve: The aortic valve appears structurally normal. There is no evidence of aortic valve regurgitation.  Mitral Valve: The mitral valve is normal in structure. There is trace mitral valve regurgitation.  Tricuspid Valve: The tricuspid valve is structurally normal. No evidence of tricuspid regurgitation.  Pulmonic Valve: The pulmonic valve is structurally normal. There is no indication of pulmonic valve regurgitation.  Pericardium: There is no pericardial effusion noted.  Aorta: The aortic root is normal. There is mild dilatation of the ascending aorta.      CONCLUSIONS:  1. Left ventricular systolic function is mildly decreased with a 45-50% estimated ejection fraction.  2. The left atrium is mild to moderately dilated.  3. There is global hypokinesis of the left ventricle with minor regional variations.    QUANTITATIVE DATA SUMMARY:  2D MEASUREMENTS:  Normal Ranges:  Ao Root d:     3.50 cm   (2.0-3.7cm)  LAs:           3.98 cm   (2.7-4.0cm)  IVSd:          1.45 cm   (0.6-1.1cm)  LVPWd:         1.43 cm   (0.6-1.1cm)  LVIDd:         3.82 cm   (3.9-5.9cm)  LVIDs:         2.93 cm  LV Mass Index: 80.6 g/m2  LV % FS        23.2 %    LA VOLUME:  Normal Ranges:  LA Vol A4C:        74.8 ml    (22+/-6mL/m2)  LA Vol A2C:        93.9 ml  LA Vol BP:         87.5 ml  LA Vol Index A4C:  29.6ml/m2  LA Vol Index A2C:  37.1 ml/m2  LA Vol Index BP:   34.6 ml/m2  LA Area A4C:       24.1 cm2  LA Area A2C:       28.2 cm2  LA Major Axis A4C: 6.6 cm  LA Major Axis A2C: 7.2 cm  LA Vol A4C:        72.0 ml  LA Vol A2C:        92.5 ml    RA VOLUME BY A/L METHOD:  Normal  Ranges:  RA Area A4C: 21.6 cm2    AORTA MEASUREMENTS:  Normal Ranges:  Ao STJ, d: 3.10 cm (1.7-3.4cm)  Asc Ao, d: 4.00 cm (2.1-3.4cm)    LV SYSTOLIC FUNCTION BY 2D PLANIMETRY (MOD):  Normal Ranges:  EF-A4C View: 49.3 % (>=55%)  EF-A2C View: 48.1 %  EF-Biplane:  48.0 %    LV DIASTOLIC FUNCTION:  Normal Ranges:  MV Peak E:    0.66 m/s (0.7-1.2 m/s)  MV Peak A:    0.01 m/s (0.42-0.7 m/s)  E/A Ratio:    73.44    (1.0-2.2)  MV e'         0.16 m/s (>8.0)  MV lateral e' 0.18 m/s  MV medial e'  0.13 m/s  E/e' Ratio:   4.27     (<8.0)    MITRAL VALVE:  Normal Ranges:  MV DT: 154 msec (150-240msec)    AORTIC VALVE:  Normal Ranges:  LVOT Max Jorge:  0.94 m/s (<=1.1m/s)  LVOT VTI:      15.24 cm  LVOT Diameter: 2.41 cm  (1.8-2.4cm)      RIGHT VENTRICLE:  RV Basal 4.25 cm  RV Mid   3.70 cm  RV Major 7.3 cm  TAPSE:   17.4 mm  RV s'    0.16 m/s    TRICUSPID VALVE/RVSP:  Normal Ranges:  IVC Diam: 1.83 cm  TV e'     0.1 m/s    AORTA:  Asc Ao Diam 4.03 cm      [id number] Sameer Malin MD  Electronically signed on 9/19/2023 at 2:02:54 PM         Final     Stress Testing IMESULT(PCC4191:1:1825): No results found for this or any previous visit from the past 1825 days.    Cardiac Catheterization: No results found for this or any previous visit from the past 1825 days.  No results found for this or any previous visit from the past 3650 days.     Cardiac Scoring: No results found for this or any previous visit from the past 1825 days.    AAA : No results found for this or any previous visit from the past 1825 days.    OTHER: No results found for this or any previous visit from the past 1825 days.    LAST IMAGING RESULTS  Echocardiogram  [place] [id number]  Phone [phone] Fax [phone]    TRANSTHORACIC ECHOCARDIOGRAM REPORT    Patient Name:     BRAYAN HICKEY   Reading Physician:  [id number] Sameer Malin MD  III  Study Date:       9/19/2023            Referring           KRISTI  OLIVIA  Physician:  MRN/PID:          [id number]             PCP:  Accession/Order#: [id number]         [facility] Echo  Location:           Lab  YOB: 1975            Fellow:  Gender:           M                    Nurse:  Admit Date:                            Sonographer:        [facility]  Admission Status: Outpatient           Additional Staff:  Height:           187.00 cm            CC Report to:  Weight:           131.00 kg            Study Type:         Echocardiogram  BSA:              2.53 m2  Blood Pressure: 132 /91 mmHg    Diagnosis/ICD: I42.9-Cardiomyopathy, unspecified; I48.0-Paroxysmal atrial  fibrillation  Indication:    Cardiomyopathy, Afib  Procedure/CPT: Echo Complete w Full Doppler-[id number]    Patient History:  Pertinent History: A-Fib.    Study Detail: The following Echo studies were performed: 2D, M-Mode, Doppler and  color flow. Technically challenging study due to body habitus and  prominent lung artifact.    PHYSICIAN INTERPRETATION:  Left Ventricle: Left ventricular systolic function is mildly decreased, with an estimated ejection fraction of 45-50%. There is global hypokinesis of the left ventricle with minor regional variations. The left ventricular cavity size is normal. There is mild to moderate left ventricular hypertrophy. Left ventricular diastolic filling was indeterminate.  Left Atrium: The left atrium is mild to moderately dilated.  Right Ventricle: The right ventricle is slightly enlarged. There is normal right ventricular global systolic function.  Right Atrium: The right atrium is mildly dilated.  Aortic Valve: The aortic valve appears structurally normal. There is no evidence of aortic valve regurgitation.  Mitral Valve: The mitral valve is normal in structure. There is trace mitral valve regurgitation.  Tricuspid Valve: The tricuspid valve is structurally normal. No evidence of tricuspid regurgitation.  Pulmonic Valve: The pulmonic  valve is structurally normal. There is no indication of pulmonic valve regurgitation.  Pericardium: There is no pericardial effusion noted.  Aorta: The aortic root is normal. There is mild dilatation of the ascending aorta.    CONCLUSIONS:  1. Left ventricular systolic function is mildly decreased with a 45-50% estimated ejection fraction.  2. The left atrium is mild to moderately dilated.  3. There is global hypokinesis of the left ventricle with minor regional variations.    QUANTITATIVE DATA SUMMARY:  2D MEASUREMENTS:  Normal Ranges:  Ao Root d:     3.50 cm   (2.0-3.7cm)  LAs:           3.98 cm   (2.7-4.0cm)  IVSd:          1.45 cm   (0.6-1.1cm)  LVPWd:         1.43 cm   (0.6-1.1cm)  LVIDd:         3.82 cm   (3.9-5.9cm)  LVIDs:         2.93 cm  LV Mass Index: 80.6 g/m2  LV % FS        23.2 %    LA VOLUME:  Normal Ranges:  LA Vol A4C:        74.8 ml    (22+/-6mL/m2)  LA Vol A2C:        93.9 ml  LA Vol BP:         87.5 ml  LA Vol Index A4C:  29.6ml/m2  LA Vol Index A2C:  37.1 ml/m2  LA Vol Index BP:   34.6 ml/m2  LA Area A4C:       24.1 cm2  LA Area A2C:       28.2 cm2  LA Major Axis A4C: 6.6 cm  LA Major Axis A2C: 7.2 cm  LA Vol A4C:        72.0 ml  LA Vol A2C:        92.5 ml    RA VOLUME BY A/L METHOD:  Normal Ranges:  RA Area A4C: 21.6 cm2    AORTA MEASUREMENTS:  Normal Ranges:  Ao STJ, d: 3.10 cm (1.7-3.4cm)  Asc Ao, d: 4.00 cm (2.1-3.4cm)    LV SYSTOLIC FUNCTION BY 2D PLANIMETRY (MOD):  Normal Ranges:  EF-A4C View: 49.3 % (>=55%)  EF-A2C View: 48.1 %  EF-Biplane:  48.0 %    LV DIASTOLIC FUNCTION:  Normal Ranges:  MV Peak E:    0.66 m/s (0.7-1.2 m/s)  MV Peak A:    0.01 m/s (0.42-0.7 m/s)  E/A Ratio:    73.44    (1.0-2.2)  MV e'         0.16 m/s (>8.0)  MV lateral e' 0.18 m/s  MV medial e'  0.13 m/s  E/e' Ratio:   4.27     (<8.0)    MITRAL VALVE:  Normal Ranges:  MV DT: 154 msec (150-240msec)    AORTIC VALVE:  Normal Ranges:  LVOT Max Jorge:  0.94 m/s (<=1.1m/s)  LVOT VTI:      15.24 cm  LVOT Diameter: 2.41 cm   (1.8-2.4cm)    RIGHT VENTRICLE:  RV Basal 4.25 cm  RV Mid   3.70 cm  RV Major 7.3 cm  TAPSE:   17.4 mm  RV s'    0.16 m/s    TRICUSPID VALVE/RVSP:  Normal Ranges:  IVC Diam: 1.83 cm  TV e'     0.1 m/s    AORTA:  Asc Ao Diam 4.03 cm    [id number] Sameer Malin MD  Electronically signed on 9/19/2023 at 2:02:54 PM    *** Final ***    Problem List Items Addressed This Visit       Benign essential hypertension (Chronic)    Cardiomyopathy (Multi) (Chronic)    Obstructive sleep apnea syndrome    Paroxysmal atrial fibrillation (Multi) - Primary (Chronic)    On apixaban therapy    Long term current use of amiodarone    Obesity (BMI 30-39.9)          Tony Mckeon DO, FACC, FACOI

## 2024-05-09 ENCOUNTER — PHARMACY VISIT (OUTPATIENT)
Dept: PHARMACY | Facility: CLINIC | Age: 49
End: 2024-05-09
Payer: COMMERCIAL

## 2024-05-09 ENCOUNTER — TELEMEDICINE (OUTPATIENT)
Dept: PRIMARY CARE | Facility: CLINIC | Age: 49
End: 2024-05-09
Payer: COMMERCIAL

## 2024-05-09 DIAGNOSIS — R09.89 SINUS SYMPTOM: Primary | ICD-10-CM

## 2024-05-09 DIAGNOSIS — R06.2 WHEEZING: ICD-10-CM

## 2024-05-09 PROCEDURE — 99213 OFFICE O/P EST LOW 20 MIN: CPT | Performed by: NURSE PRACTITIONER

## 2024-05-09 PROCEDURE — RXMED WILLOW AMBULATORY MEDICATION CHARGE

## 2024-05-09 RX ORDER — PREDNISONE 20 MG/1
40 TABLET ORAL DAILY
Qty: 10 TABLET | Refills: 0 | Status: SHIPPED | OUTPATIENT
Start: 2024-05-09 | End: 2024-05-14

## 2024-05-09 RX ORDER — AMOXICILLIN AND CLAVULANATE POTASSIUM 875; 125 MG/1; MG/1
1 TABLET, FILM COATED ORAL 2 TIMES DAILY
Qty: 14 TABLET | Refills: 0 | Status: SHIPPED | OUTPATIENT
Start: 2024-05-09 | End: 2024-05-16

## 2024-05-09 ASSESSMENT — LIFESTYLE VARIABLES: HISTORY_OF_SMOKING: I HAVE NEVER SMOKED

## 2024-05-09 NOTE — PROGRESS NOTES
"Subjective   Patient ID: Donavan Ordaz III \"Francisco\" is a 49 y.o. male who presents for URI.  URI sx about 10 days- headache, myalgias, nasal symptons  Today LGF and cough, wheezy   Describes second sickening, now facial pressure and wheezy cough.  Hx of same  Xhance ordered and uses.  LABA with ICS also in use.    URI         Review of Systems    Objective   Physical Exam  Constitutional:       General: He is not in acute distress.     Appearance: He is not toxic-appearing.   Pulmonary:      Effort: Pulmonary effort is normal.      Comments: Wheezy cough evident  Musculoskeletal:      Cervical back: Neck supple.   Neurological:      Mental Status: He is oriented to person, place, and time.         Assessment/Plan   Diagnoses and all orders for this visit:  Sinus symptom  -     amoxicillin-pot clavulanate (Augmentin) 875-125 mg tablet; Take 1 tablet by mouth 2 times a day for 7 days.  -     predniSONE (Deltasone) 20 mg tablet; Take 2 tablets (40 mg) by mouth once daily for 5 days.  Wheezing  -     amoxicillin-pot clavulanate (Augmentin) 875-125 mg tablet; Take 1 tablet by mouth 2 times a day for 7 days.  -     predniSONE (Deltasone) 20 mg tablet; Take 2 tablets (40 mg) by mouth once daily for 5 days.           BRANDAN Vivar-CNP 05/09/24 10:25 AM   "

## 2024-05-13 ENCOUNTER — APPOINTMENT (OUTPATIENT)
Dept: CARDIOLOGY | Facility: CLINIC | Age: 49
End: 2024-05-13
Payer: COMMERCIAL

## 2024-05-13 DIAGNOSIS — I10 BENIGN ESSENTIAL HYPERTENSION: Chronic | ICD-10-CM

## 2024-05-13 DIAGNOSIS — G47.33 OBSTRUCTIVE SLEEP APNEA SYNDROME: ICD-10-CM

## 2024-05-13 DIAGNOSIS — E66.9 OBESITY (BMI 30-39.9): ICD-10-CM

## 2024-05-13 DIAGNOSIS — Z79.899 LONG TERM CURRENT USE OF AMIODARONE: ICD-10-CM

## 2024-05-13 DIAGNOSIS — I48.0 PAROXYSMAL ATRIAL FIBRILLATION (MULTI): Primary | Chronic | ICD-10-CM

## 2024-05-13 DIAGNOSIS — Z79.01 ON APIXABAN THERAPY: ICD-10-CM

## 2024-05-13 DIAGNOSIS — I42.9 CARDIOMYOPATHY, UNSPECIFIED TYPE (MULTI): Chronic | ICD-10-CM

## 2024-05-17 NOTE — ASSESSMENT & PLAN NOTE
HX and limited exam c/w secondary sickening, will treat.  Continue usual regimen, can add guaifenesin, reviewed r/b/a of steroid burst.  Reviewed expected course versus symptoms that should prompt an in person visit, sudden or acute changes to go to ED.

## 2024-06-04 ENCOUNTER — APPOINTMENT (OUTPATIENT)
Dept: ENDOCRINOLOGY | Facility: CLINIC | Age: 49
End: 2024-06-04
Payer: COMMERCIAL

## 2024-06-09 ENCOUNTER — PHARMACY VISIT (OUTPATIENT)
Dept: PHARMACY | Facility: CLINIC | Age: 49
End: 2024-06-09
Payer: COMMERCIAL

## 2024-06-09 PROCEDURE — RXMED WILLOW AMBULATORY MEDICATION CHARGE

## 2024-06-11 DIAGNOSIS — J45.40 MODERATE PERSISTENT ASTHMA WITHOUT COMPLICATION (HHS-HCC): Chronic | ICD-10-CM

## 2024-06-11 RX ORDER — ALBUTEROL SULFATE 90 UG/1
2 AEROSOL, METERED RESPIRATORY (INHALATION) EVERY 4 HOURS PRN
Qty: 18 G | Refills: 1 | Status: SHIPPED | OUTPATIENT
Start: 2024-06-11

## 2024-07-07 PROCEDURE — RXMED WILLOW AMBULATORY MEDICATION CHARGE

## 2024-07-10 ENCOUNTER — PHARMACY VISIT (OUTPATIENT)
Dept: PHARMACY | Facility: CLINIC | Age: 49
End: 2024-07-10
Payer: COMMERCIAL

## 2024-08-07 ENCOUNTER — APPOINTMENT (OUTPATIENT)
Dept: CARDIOLOGY | Facility: CLINIC | Age: 49
End: 2024-08-07
Payer: COMMERCIAL

## 2024-08-13 PROCEDURE — RXMED WILLOW AMBULATORY MEDICATION CHARGE

## 2024-08-17 ENCOUNTER — PHARMACY VISIT (OUTPATIENT)
Dept: PHARMACY | Facility: CLINIC | Age: 49
End: 2024-08-17
Payer: COMMERCIAL

## 2024-08-26 PROCEDURE — RXMED WILLOW AMBULATORY MEDICATION CHARGE

## 2024-08-28 ENCOUNTER — PHARMACY VISIT (OUTPATIENT)
Dept: PHARMACY | Facility: CLINIC | Age: 49
End: 2024-08-28
Payer: COMMERCIAL

## 2024-09-04 ENCOUNTER — TELEMEDICINE (OUTPATIENT)
Dept: PRIMARY CARE | Facility: CLINIC | Age: 49
End: 2024-09-04
Payer: COMMERCIAL

## 2024-09-04 DIAGNOSIS — J01.10 ACUTE NON-RECURRENT FRONTAL SINUSITIS: Primary | ICD-10-CM

## 2024-09-04 PROCEDURE — 1036F TOBACCO NON-USER: CPT | Performed by: NURSE PRACTITIONER

## 2024-09-04 PROCEDURE — 99212 OFFICE O/P EST SF 10 MIN: CPT | Performed by: NURSE PRACTITIONER

## 2024-09-04 RX ORDER — BENZONATATE 200 MG/1
200 CAPSULE ORAL 3 TIMES DAILY PRN
Qty: 42 CAPSULE | Refills: 0 | Status: SHIPPED | OUTPATIENT
Start: 2024-09-04 | End: 2024-10-04

## 2024-09-04 RX ORDER — AMOXICILLIN AND CLAVULANATE POTASSIUM 875; 125 MG/1; MG/1
1 TABLET, FILM COATED ORAL 2 TIMES DAILY
Qty: 14 TABLET | Refills: 0 | Status: SHIPPED | OUTPATIENT
Start: 2024-09-04 | End: 2024-09-11

## 2024-09-04 RX ORDER — PREDNISONE 20 MG/1
40 TABLET ORAL DAILY
Qty: 10 TABLET | Refills: 0 | Status: SHIPPED | OUTPATIENT
Start: 2024-09-04 | End: 2024-09-09

## 2024-09-04 ASSESSMENT — LIFESTYLE VARIABLES: HISTORY_OF_SMOKING: I HAVE NEVER SMOKED

## 2024-09-04 NOTE — ASSESSMENT & PLAN NOTE
- augmentin twice a day 7 days  -  Nasal saline, increase fluids  -  hand hygiene and infection prevention discussed  - prednisone 40 mg x 5 days  - benzonatate sent in  -  Warm compress to sinuses  - humidification  - recommend to eat yogurt twice daily while taking antibiotic or a daily probiotic

## 2024-09-04 NOTE — PROGRESS NOTES
Subjective   Patient ID: Francisco Ordaz III is a 49 y.o. male who presents for Illness.    This visit was completed via [facility] due to the restrictions of the COVID-19 pandemic. All issues as below were discussed and addressed but no physical exam was performed. If it was felt that the patient should be evaluated in clinic than they were directed there. The patient verbally consented to the visit.  Patient is located in [place] and verified   Presents with complaints of sinus congestion, sinus headache, rhinorrhea, PND, and wheezing x 2 weeks.  Has had non productive cough   Has been having wheezing over the past few days.           Review of Systems   All other systems reviewed and are negative.      Objective   There were no vitals taken for this visit.    Physical Exam    Assessment/Plan   Problem List Items Addressed This Visit             ICD-10-CM    Acute non-recurrent frontal sinusitis - Primary J01.10     - augmentin twice a day 7 days  -  Nasal saline, increase fluids  -  hand hygiene and infection prevention discussed  - prednisone 40 mg x 5 days  - benzonatate sent in  -  Warm compress to sinuses  - humidification  - recommend to eat yogurt twice daily while taking antibiotic or a daily probiotic           Relevant Medications    predniSONE (Deltasone) 20 mg tablet    benzonatate (Tessalon) 200 mg capsule    amoxicillin-pot clavulanate (Augmentin) 875-125 mg tablet

## 2024-09-05 PROBLEM — I77.810 ASCENDING AORTA DILATATION (CMS-HCC): Status: ACTIVE | Noted: 2024-09-05

## 2024-09-09 ENCOUNTER — APPOINTMENT (OUTPATIENT)
Dept: CARDIOLOGY | Facility: CLINIC | Age: 49
End: 2024-09-09
Payer: COMMERCIAL

## 2024-09-22 PROCEDURE — RXMED WILLOW AMBULATORY MEDICATION CHARGE

## 2024-09-24 DIAGNOSIS — I42.9 CARDIOMYOPATHY, UNSPECIFIED TYPE (MULTI): Chronic | ICD-10-CM

## 2024-09-24 DIAGNOSIS — I10 BENIGN ESSENTIAL HYPERTENSION: Chronic | ICD-10-CM

## 2024-09-24 DIAGNOSIS — I48.0 PAROXYSMAL ATRIAL FIBRILLATION (MULTI): Chronic | ICD-10-CM

## 2024-09-24 RX ORDER — LISINOPRIL 20 MG/1
20 TABLET ORAL 2 TIMES DAILY
Qty: 180 TABLET | Refills: 1 | Status: CANCELLED | OUTPATIENT
Start: 2024-09-24 | End: 2025-09-24

## 2024-09-24 RX ORDER — SPIRONOLACTONE 25 MG/1
25 TABLET ORAL DAILY
Qty: 90 TABLET | Refills: 1 | Status: CANCELLED | OUTPATIENT
Start: 2024-09-24 | End: 2025-09-24

## 2024-09-24 RX ORDER — AMIODARONE HYDROCHLORIDE 200 MG/1
200 TABLET ORAL DAILY
Qty: 90 TABLET | Refills: 1 | Status: CANCELLED | OUTPATIENT
Start: 2024-09-24 | End: 2025-09-24

## 2024-09-25 ENCOUNTER — PHARMACY VISIT (OUTPATIENT)
Dept: PHARMACY | Facility: CLINIC | Age: 49
End: 2024-09-25
Payer: COMMERCIAL

## 2024-10-15 ENCOUNTER — TELEMEDICINE (OUTPATIENT)
Dept: PRIMARY CARE | Facility: CLINIC | Age: 49
End: 2024-10-15
Payer: COMMERCIAL

## 2024-10-15 DIAGNOSIS — I48.0 PAROXYSMAL ATRIAL FIBRILLATION (MULTI): Chronic | ICD-10-CM

## 2024-10-15 DIAGNOSIS — I50.42 CHRONIC COMBINED SYSTOLIC AND DIASTOLIC HEART FAILURE: Chronic | ICD-10-CM

## 2024-10-15 DIAGNOSIS — J45.901 EXACERBATION OF ASTHMA, UNSPECIFIED ASTHMA SEVERITY, UNSPECIFIED WHETHER PERSISTENT (HHS-HCC): Primary | ICD-10-CM

## 2024-10-15 DIAGNOSIS — I42.9 CARDIOMYOPATHY, UNSPECIFIED TYPE (MULTI): Chronic | ICD-10-CM

## 2024-10-15 DIAGNOSIS — I10 BENIGN ESSENTIAL HYPERTENSION: Chronic | ICD-10-CM

## 2024-10-15 DIAGNOSIS — J32.9 BACTERIAL SINUSITIS: ICD-10-CM

## 2024-10-15 DIAGNOSIS — B96.89 BACTERIAL SINUSITIS: ICD-10-CM

## 2024-10-15 PROCEDURE — 99214 OFFICE O/P EST MOD 30 MIN: CPT | Performed by: NURSE PRACTITIONER

## 2024-10-15 RX ORDER — PSEUDOEPHEDRINE HYDROCHLORIDE 60 MG/1
60 TABLET ORAL EVERY 6 HOURS PRN
Qty: 12 TABLET | Refills: 0 | Status: SHIPPED | OUTPATIENT
Start: 2024-10-15 | End: 2024-10-15

## 2024-10-15 RX ORDER — METHYLPREDNISOLONE 4 MG/1
TABLET ORAL
Qty: 21 TABLET | Refills: 0 | Status: SHIPPED | OUTPATIENT
Start: 2024-10-15 | End: 2024-10-22

## 2024-10-15 RX ORDER — SPIRONOLACTONE 25 MG/1
25 TABLET ORAL DAILY
Qty: 90 TABLET | Refills: 1 | Status: CANCELLED | OUTPATIENT
Start: 2024-10-15 | End: 2025-10-15

## 2024-10-15 RX ORDER — GUAIFENESIN 600 MG/1
1200 TABLET, EXTENDED RELEASE ORAL 2 TIMES DAILY
Qty: 28 TABLET | Refills: 0 | Status: SHIPPED | OUTPATIENT
Start: 2024-10-15 | End: 2024-10-22

## 2024-10-15 RX ORDER — PSEUDOEPHEDRINE HCL 30 MG
30 TABLET ORAL EVERY 6 HOURS PRN
Qty: 6 TABLET | Refills: 0 | Status: SHIPPED | OUTPATIENT
Start: 2024-10-15 | End: 2024-10-18

## 2024-10-15 RX ORDER — AZITHROMYCIN 250 MG/1
TABLET, FILM COATED ORAL
Qty: 6 TABLET | Refills: 0 | Status: SHIPPED | OUTPATIENT
Start: 2024-10-15 | End: 2024-10-20

## 2024-10-15 RX ORDER — LISINOPRIL 20 MG/1
20 TABLET ORAL 2 TIMES DAILY
Qty: 180 TABLET | Refills: 1 | Status: CANCELLED | OUTPATIENT
Start: 2024-10-15 | End: 2025-10-15

## 2024-10-15 RX ORDER — SPIRONOLACTONE 25 MG/1
25 TABLET ORAL DAILY
Qty: 90 TABLET | Refills: 1 | Status: CANCELLED | OUTPATIENT
Start: 2024-10-15

## 2024-10-15 RX ORDER — AMIODARONE HYDROCHLORIDE 200 MG/1
200 TABLET ORAL DAILY
Qty: 90 TABLET | Refills: 1 | Status: CANCELLED | OUTPATIENT
Start: 2024-10-15 | End: 2025-10-15

## 2024-10-15 ASSESSMENT — ENCOUNTER SYMPTOMS
HOARSE VOICE: 1
HEADACHES: 1
RHINORRHEA: 1
WHEEZING: 1
COUGH: 1
CHILLS: 0
SINUS PRESSURE: 1

## 2024-10-15 ASSESSMENT — LIFESTYLE VARIABLES: HISTORY_OF_SMOKING: I HAVE NEVER SMOKED

## 2024-10-15 NOTE — PROGRESS NOTES
"Subjective   Patient ID: Donavan rOdaz III \"Francisco\" is a 49 y.o. male who presents for a Virtual Visit Sinusitis and Cough.  Sinusitis  This is a new problem. The current episode started 1 to 4 weeks ago (11 days ago). The problem has been gradually worsening since onset. There has been no fever. He is experiencing no pain. Associated symptoms include congestion, coughing, headaches, a hoarse voice and sinus pressure. Pertinent negatives include no chills. (Endorses productive cough, wheeze, history of asthma) Treatments tried: Inhaler. The treatment provided no relief.   Cough  This is a new problem. The current episode started 1 to 4 weeks ago. The problem has been gradually worsening. The problem occurs hourly. The cough is Productive of sputum. Associated symptoms include headaches, nasal congestion, rhinorrhea and wheezing. Pertinent negatives include no chills. Associated symptoms comments: Ears feels full. He has tried OTC inhaler for the symptoms. The treatment provided no relief. His past medical history is significant for asthma.   Covid test 4 days ago negative    Review of Systems   Constitutional:  Negative for chills.   HENT:  Positive for congestion, hoarse voice, rhinorrhea and sinus pressure.    Respiratory:  Positive for cough and wheezing.    Neurological:  Positive for headaches.       Physical Exam not performed  E-visit questionnaire reviewed and discussed with patient.   All questions answered    Assessment/Plan   Problem List Items Addressed This Visit    None  Visit Diagnoses         Codes    Exacerbation of asthma, unspecified asthma severity, unspecified whether persistent ([facility])    -  Primary J45.901    Relevant Medications    methylPREDNISolone (Medrol Dospak) 4 mg tablets    guaiFENesin (Mucinex) 600 mg 12 hr tablet    Bacterial sinusitis     J32.9, B96.89    Relevant Medications    azithromycin (Zithromax) 250 mg tablet    methylPREDNISolone (Medrol Dospak) 4 mg tablets    " guaiFENesin (Mucinex) 600 mg 12 hr tablet    pseudoephedrine (Sudafed) 30 mg tablet        Continue albuterol inhaler twice daily, 2 puffs  Discussed with patient   Verbalized understanding, Teach back utilized  Recommend follow up with PCP in 1 week  Handouts provided via My Chart

## 2024-10-18 DIAGNOSIS — I50.42 CHRONIC COMBINED SYSTOLIC AND DIASTOLIC HEART FAILURE: Chronic | ICD-10-CM

## 2024-10-18 DIAGNOSIS — I48.0 PAROXYSMAL ATRIAL FIBRILLATION (MULTI): Chronic | ICD-10-CM

## 2024-10-18 DIAGNOSIS — I42.9 CARDIOMYOPATHY, UNSPECIFIED TYPE (MULTI): Chronic | ICD-10-CM

## 2024-10-18 DIAGNOSIS — I10 BENIGN ESSENTIAL HYPERTENSION: Chronic | ICD-10-CM

## 2024-10-18 RX ORDER — AMIODARONE HYDROCHLORIDE 200 MG/1
200 TABLET ORAL DAILY
Qty: 90 TABLET | Refills: 1 | Status: CANCELLED | OUTPATIENT
Start: 2024-10-15 | End: 2025-10-15

## 2024-10-18 RX ORDER — SPIRONOLACTONE 25 MG/1
25 TABLET ORAL DAILY
Qty: 90 TABLET | Refills: 1 | Status: CANCELLED | OUTPATIENT
Start: 2024-10-15 | End: 2025-10-15

## 2024-10-18 RX ORDER — LISINOPRIL 20 MG/1
20 TABLET ORAL 2 TIMES DAILY
Qty: 180 TABLET | Refills: 1 | Status: CANCELLED | OUTPATIENT
Start: 2024-10-15 | End: 2025-10-15

## 2024-10-18 RX ORDER — SPIRONOLACTONE 25 MG/1
25 TABLET ORAL DAILY
Qty: 90 TABLET | Refills: 1 | Status: CANCELLED | OUTPATIENT
Start: 2024-10-15

## 2024-10-21 DIAGNOSIS — I42.9 CARDIOMYOPATHY, UNSPECIFIED TYPE (MULTI): Chronic | ICD-10-CM

## 2024-10-21 DIAGNOSIS — Z51.81 ENCOUNTER FOR MONITORING AMIODARONE THERAPY: Primary | ICD-10-CM

## 2024-10-21 DIAGNOSIS — Z79.899 ENCOUNTER FOR MONITORING AMIODARONE THERAPY: Primary | ICD-10-CM

## 2024-10-21 DIAGNOSIS — I48.0 PAROXYSMAL ATRIAL FIBRILLATION (MULTI): Chronic | ICD-10-CM

## 2024-10-21 DIAGNOSIS — I10 BENIGN ESSENTIAL HYPERTENSION: Chronic | ICD-10-CM

## 2024-10-21 RX ORDER — SPIRONOLACTONE 25 MG/1
25 TABLET ORAL DAILY
Qty: 90 TABLET | Refills: 1 | Status: CANCELLED | OUTPATIENT
Start: 2024-10-21 | End: 2025-10-21

## 2024-10-21 RX ORDER — AMIODARONE HYDROCHLORIDE 200 MG/1
200 TABLET ORAL DAILY
Qty: 90 TABLET | Refills: 0 | Status: CANCELLED | OUTPATIENT
Start: 2024-10-21 | End: 2025-10-21

## 2024-10-21 RX ORDER — LISINOPRIL 20 MG/1
20 TABLET ORAL 2 TIMES DAILY
Qty: 180 TABLET | Refills: 1 | Status: CANCELLED | OUTPATIENT
Start: 2024-10-21 | End: 2025-10-21

## 2024-10-21 RX ORDER — AMIODARONE HYDROCHLORIDE 200 MG/1
200 TABLET ORAL DAILY
Qty: 90 TABLET | Refills: 1 | Status: CANCELLED | OUTPATIENT
Start: 2024-10-21 | End: 2025-10-21

## 2024-10-23 DIAGNOSIS — I42.9 CARDIOMYOPATHY, UNSPECIFIED TYPE (MULTI): Chronic | ICD-10-CM

## 2024-10-23 DIAGNOSIS — I50.42 CHRONIC COMBINED SYSTOLIC AND DIASTOLIC HEART FAILURE: Chronic | ICD-10-CM

## 2024-10-23 DIAGNOSIS — I48.0 PAROXYSMAL ATRIAL FIBRILLATION (MULTI): Chronic | ICD-10-CM

## 2024-10-23 RX ORDER — SPIRONOLACTONE 25 MG/1
25 TABLET ORAL DAILY
Qty: 90 TABLET | Refills: 1 | Status: CANCELLED | OUTPATIENT
Start: 2024-10-21 | End: 2025-10-21

## 2024-10-23 RX ORDER — SPIRONOLACTONE 25 MG/1
25 TABLET ORAL DAILY
Qty: 90 TABLET | Refills: 1 | Status: CANCELLED | OUTPATIENT
Start: 2024-10-15

## 2024-10-23 RX ORDER — AMIODARONE HYDROCHLORIDE 200 MG/1
200 TABLET ORAL DAILY
Qty: 90 TABLET | Refills: 1 | Status: CANCELLED | OUTPATIENT
Start: 2024-10-21 | End: 2025-10-21

## 2024-10-25 DIAGNOSIS — I48.0 PAROXYSMAL ATRIAL FIBRILLATION (MULTI): Chronic | ICD-10-CM

## 2024-10-25 RX ORDER — AMIODARONE HYDROCHLORIDE 200 MG/1
200 TABLET ORAL DAILY
Qty: 90 TABLET | Refills: 1 | Status: CANCELLED | OUTPATIENT
Start: 2024-10-25 | End: 2025-10-25

## 2024-10-28 ENCOUNTER — PHARMACY VISIT (OUTPATIENT)
Dept: PHARMACY | Facility: CLINIC | Age: 49
End: 2024-10-28
Payer: COMMERCIAL

## 2024-10-28 DIAGNOSIS — Z79.899 ENCOUNTER FOR MONITORING AMIODARONE THERAPY: Primary | ICD-10-CM

## 2024-10-28 DIAGNOSIS — Z51.81 ENCOUNTER FOR MONITORING AMIODARONE THERAPY: Primary | ICD-10-CM

## 2024-10-28 PROCEDURE — RXMED WILLOW AMBULATORY MEDICATION CHARGE

## 2024-10-28 RX ORDER — AMIODARONE HYDROCHLORIDE 200 MG/1
200 TABLET ORAL DAILY
Qty: 90 TABLET | Refills: 1 | OUTPATIENT
Start: 2024-10-28 | End: 2025-10-28

## 2024-10-28 RX ORDER — AMIODARONE HYDROCHLORIDE 200 MG/1
200 TABLET ORAL DAILY
Qty: 60 TABLET | Refills: 0 | Status: SHIPPED | OUTPATIENT
Start: 2024-10-28 | End: 2025-10-28

## 2024-11-04 DIAGNOSIS — I10 BENIGN ESSENTIAL HYPERTENSION: Chronic | ICD-10-CM

## 2024-11-05 ENCOUNTER — PHARMACY VISIT (OUTPATIENT)
Dept: PHARMACY | Facility: CLINIC | Age: 49
End: 2024-11-05
Payer: COMMERCIAL

## 2024-11-05 PROCEDURE — RXMED WILLOW AMBULATORY MEDICATION CHARGE

## 2024-11-05 RX ORDER — LISINOPRIL 20 MG/1
20 TABLET ORAL 2 TIMES DAILY
Qty: 180 TABLET | Refills: 1 | Status: SHIPPED | OUTPATIENT
Start: 2024-11-05 | End: 2025-11-05

## 2024-11-19 NOTE — PROGRESS NOTES
"             Sinus & Skull Base Surgery    Chief Complaint:  1.   Chronic sinusitis with nasal polyposis; recurrent sinusitis  2.   Nasal airway obstruction, deviated septum, inferior turbinate hypertrophy  3.   Rhinorrhea  4.   Facial pressure  5.   Decreased sense of smell  6.   Throat clearing, dysphonia  7.   Asthma  8.   Negative allergy testing 2020  9. Cardiomyopathy following with cardiology  10. Narrow external valves bilaterally  11. Obstructive sleep apnea  12. Atrial fibrillation on Eliquis    History Of Present Illness:    Donavan Potterherbmicheline III \"Francisco\" presents since last being seen 3/13/23.     He has continued to have recurrent sinus infections that resulted in significant coughing and mucus production.  He has had multiple courses of oral antibiotic therapy (5-6) over the last 12 months.  Generally he gets amoxicillin but he has also been given azithromycin.  He gets prednisone with each exacerbation.  The infections seem to be worse when he travels.    When he uses Breathe Right strips, he will get benefit.    Main Symptoms:  Patient has anterior nasal drainage. Mostly clear  Patient has posterior nasal drainage.  Patient has nasal airway obstruction.  Patient has facial pain.  Patient has facial pressure.  Patient has decreased sense of smell. Decreased 75 % of normal.  Associated Symptoms:  Patient does not have headaches.  Patient has throat clearing.  Patient has coughing.  Patient does not have dysphonia.  Patient does not have sneezing.  Patient does not have itchy eyes.  Patient does not have nasal bleeding.    Medications currently on for sinonasal symptoms  Xhance, Flonase inconsistently    Active Problems:  Patient Active Problem List   Diagnosis    Allergic rhinitis    Asthma    Benign essential hypertension    Cardiomyopathy    Chronic combined systolic and diastolic heart failure    Obstructive sleep apnea syndrome    Paroxysmal atrial fibrillation (Multi)    Hyperthyroidism    On " apixaban therapy    Long term current use of amiodarone    Class 2 severe obesity due to excess calories with serious comorbidity and body mass index (BMI) of 36.0 to 36.9 in adult    Obesity (BMI 30-39.9)    Acute non-recurrent frontal sinusitis    Ascending aorta dilatation (CMS-HCC)     Past Medical History:  He has a past medical history of Personal history of other diseases of the respiratory system (01/03/2018), Personal history of other diseases of the respiratory system (10/30/2019), and Unspecified foreign body in bronchus causing asphyxiation, initial encounter (10/01/2020).    Surgical History:  He has a past surgical history that includes Other surgical history (05/21/2020) and Other surgical history (05/20/2020).    Family History:  Family History   Problem Relation Name Age of Onset    Hypertension Mother       Social History:  He reports that he has never smoked. He has never used smokeless tobacco. He reports that he does not currently use alcohol. He reports that he does not use drugs.    Allergies:  Patient has no known allergies.    Current Meds:  Current Outpatient Medications:     albuterol (Ventolin HFA) 90 mcg/actuation inhaler, INHALE 2 PUFFS EVERY 4 HOURS IF NEEDED FOR WHEEZING OR SHORTNESS OF BREATH., Disp: 18 g, Rfl: 1    albuterol 2.5 mg /3 mL (0.083 %) nebulizer solution, Take 3 mL (2.5 mg) by nebulization every 4 hours if needed., Disp: , Rfl:     amiodarone (Pacerone) 200 mg tablet, TAKE 1 TABLET BY MOUTH DAILY, Disp: 60 tablet, Rfl: 0    apixaban (Eliquis) 5 mg tablet, Take 1 tablet (5 mg) by mouth 2 times a day., Disp: 180 tablet, Rfl: 3    azelastine (Astelin) 137 mcg (0.1 %) nasal spray, Administer 2 sprays into each nostril 2 times a day., Disp: , Rfl:     carvedilol (Coreg) 25 mg tablet, TAKE 1 TABLET (25 MG) BY MOUTH TWICE A DAY WITH MEALS, Disp: 180 tablet, Rfl: 3    fluticasone (Flonase) 50 mcg/actuation nasal spray, USE 2 SPRAYS IN EACH NOSTRIL EVERY MORNING, Disp: 48 mL,  Rfl: 1    lisinopril 20 mg tablet, Take 1 tablet (20 mg) by mouth 2 times a day., Disp: , Rfl:     lisinopril 20 mg tablet, TAKE 1 TABLET BY MOUTH TWO TIMES A DAY, Disp: 180 tablet, Rfl: 1    pseudoephedrine (Sudafed) 30 mg tablet, Take 1 tablet (30 mg) by mouth every 6 hours if needed for congestion for up to 3 days., Disp: 6 tablet, Rfl: 0    spironolactone (Aldactone) 25 mg tablet, TAKE 1 TABLET DAILY., Disp: 90 tablet, Rfl: 1    Wixela Inhub 250-50 mcg/dose diskus inhaler, Inhale 1 puff 2 times a day., Disp: 60 each, Rfl: 5    Vitals:  Visit Vitals  Smoking Status Never     Physical Exam:  Nose: On external exam there are neither lesions nor asymmetry of the nasal tip/dorsum. On anterior rhinoscopy, visualization posteriorly is limited on anterior examination. For this reason, to adequately evaluate posteriorly for masses, source of epistaxis, polypoid disease, debridement, and/or signs of infections, nasal endoscopy is indicated.  (Please see procedure below.)    SINONASAL ENDOSCOPY (CPT 90632): To better evaluate the patient's symptoms, sinonasal endoscopy is indicated.  After discussion of risks and benefits, and topical decongestion and anesthesia, an endoscope was used to perform nasal endoscopy on each side.  A time out identifying the patient, the procedure, the location of the procedure and any concerns was performed prior to beginning the procedure.    Findings:  Examination of the right nasal cavity revealed a septal deviation to the right anteriorly.  He had polypoid tissue to the superior aspect of the right inferior turbinate throughout the length of the nasal cavity.  Examination of the left nasal cavity revealed polypoid tissue extending to the base of the inferior turbinate throughout the nasal cavity.  He has bilateral inferior turbinate hypertrophy    Results/Data:  I personally reviewed his last note from his primary care provider from October 15, 2024.  He was prescribed azithromycin,  "Mucinex, Medrol, and pseudoephedrine for his symptoms.    Provider Impressions:  1.   Chronic sinusitis with nasal polyposis; recurrent sinusitis  2.   Nasal airway obstruction, deviated septum, inferior turbinate hypertrophy  3.   Rhinorrhea  4.   Facial pressure and pain  5.   Decreased sense of smell  6.   Throat clearing, cough  7.   Asthma, negative allergy testing 2020  8.  Cardiomyopathy following with cardiology  9.  Narrow external valves bilaterally  10. Obstructive sleep apnea  11. Atrial fibrillation on Eliquis    Discussion:  Donavan Mendez Sushma III \"Francisco\" and I discussed options.  He has a multitude of options including trials of additional intranasal medical therapy, biologic therapy, or endoscopic sinus surgery.  In regard to intranasal medical therapy, he could consider Xhance or and over-the-counter topical steroid such as Flonase, Nasacort, or Rhinocort consistently.  He could also consider a rinse based steroid or nebulized steroid.    We discussed what would be involved in endoscopic sinus surgery today.  I would also recommend evaluation with one of my facial plastic surgery partners if he wanted to consider surgical intervention because of the benefit he gets with Breathe Right strips and his nasal narrow valves.  There would also be considerations with surgery and management of his blood thinner.    We also discussed biologic therapy.  This would be a very reasonable option for him given the severity of his polyps.  At the conclusion of today's assessment, he was comfortable being evaluated by Dr. Foley for consideration of a biologic.  A consult was placed in the system.  If he initiates this therapy, I would like to see him back in about 6 months.    He was quite bothered by his symptoms today and was requesting oral antibiotic and oral steroid therapy and these were prescribed.  He has tolerated them previously but I recommended discontinuation for any side effects.    If he defers " biologic therapy the workup for surgical intervention would begin with a noncontrast CT sinus and an assessment with one of my facial plastic surgery partners.  All questions were answered.    Between face-to-face contact, review of the medical record, and documentation I spent 38 minutes on this evaluation during the day of service.    Scribe Attestation  By signing my name below, I, Joe Alexis, Bassamibe, attest that this documentation has been prepared under the direction and in the presence of Daniel Faye MD.    Signature:  Daniel Faye MD

## 2024-11-20 ENCOUNTER — OFFICE VISIT (OUTPATIENT)
Facility: CLINIC | Age: 49
End: 2024-11-20
Payer: COMMERCIAL

## 2024-11-20 VITALS — WEIGHT: 301.2 LBS | BODY MASS INDEX: 37.45 KG/M2 | HEIGHT: 75 IN

## 2024-11-20 DIAGNOSIS — R44.8 FACIAL PRESSURE: ICD-10-CM

## 2024-11-20 DIAGNOSIS — J33.9 NASAL POLYP: Primary | ICD-10-CM

## 2024-11-20 DIAGNOSIS — J32.2 CHRONIC ETHMOIDAL SINUSITIS: ICD-10-CM

## 2024-11-20 DIAGNOSIS — R43.8 DECREASED SENSE OF SMELL: ICD-10-CM

## 2024-11-20 PROCEDURE — 99214 OFFICE O/P EST MOD 30 MIN: CPT | Performed by: OTOLARYNGOLOGY

## 2024-11-20 PROCEDURE — 31231 NASAL ENDOSCOPY DX: CPT | Performed by: OTOLARYNGOLOGY

## 2024-11-20 PROCEDURE — 3008F BODY MASS INDEX DOCD: CPT | Performed by: OTOLARYNGOLOGY

## 2024-11-20 RX ORDER — DOXYCYCLINE 100 MG/1
100 CAPSULE ORAL 2 TIMES DAILY
Qty: 20 CAPSULE | Refills: 0 | Status: SHIPPED | OUTPATIENT
Start: 2024-11-20 | End: 2024-11-30

## 2024-11-20 RX ORDER — PREDNISONE 10 MG/1
TABLET ORAL
Qty: 19 TABLET | Refills: 0 | Status: SHIPPED | OUTPATIENT
Start: 2024-11-20

## 2024-11-20 ASSESSMENT — PATIENT HEALTH QUESTIONNAIRE - PHQ9
SUM OF ALL RESPONSES TO PHQ9 QUESTIONS 1 AND 2: 0
2. FEELING DOWN, DEPRESSED OR HOPELESS: NOT AT ALL
1. LITTLE INTEREST OR PLEASURE IN DOING THINGS: NOT AT ALL

## 2024-11-20 ASSESSMENT — PAIN SCALES - GENERAL: PAINLEVEL_OUTOF10: 5

## 2024-12-01 NOTE — PROGRESS NOTES
[facility] Vascular Cardiology    Patient Name: Donavan Ordaz III  Patient : 1975      Scribe Attestation  By signing my name below, ITonia Scribe   attest that this documentation has been prepared under the direction and in the presence of Tony Mckeon DO.      Reason for visit:  This is a 49-year-old male here for follow-up regarding paroxysmal atrial fibrillation, anticoagulation with apixaban, antiarrhythmic drug therapy with amiodarone, cardiomyopathy with ejection fraction zully of 20% now 45%, hypertension, obstructive sleep apnea, and obesity.     HPI:  This is a 49-year-old male here for follow-up regarding paroxysmal atrial fibrillation, anticoagulation with apixaban, antiarrhythmic drug therapy with amiodarone, cardiomyopathy with ejection fraction zully of 20% now 45%, hypertension, obstructive sleep apnea, and obesity.  The patient was last evaluated by me in 2023.  At that visit I had ordered an echocardiogram, referred the patient to EP cardiology for evaluation of ablation, referred the patient to Endocrinology, scheduled him for an electrical cardioversion, and asked that he follow-up in 1 month.  The patient underwent successful cardioversion on 2023.  CMP done on 24 showed normal serum sodium and serum potassium with a serum creatinine of 1.19. Normal ALT and AST.  CBC showed a hemoglobin of 16.6. TSH done in 2024 was 3.12.  Echocardiogram done in 2023 showed mildly reduced left ventricular systolic function with an ejection fraction of 45 to 50%, dilated right ventricle with normal right ventricular systolic function, biatrial dilatation, mild dilatation of the ascending aorta measured at 4.0 cm. ECG done today showed sinus rhythm with a heart rate of 82 bpm.  The patient reports that he has been feeling generally well from the cardiac standpoint. He denies any new chest pain, shortness of breath, palpitations  and lightheadedness. He states that he takes all of his medications as prescribed. During my exam, he was resting comfortably on the exam table.            Assessment/Plan:   1. Paroxysmal atrial fibrillation  The patient has a history of paroxysmal atrial fibrillation and underwent successful cardioversion on 9/14/2023.   He currently on apixaban for thromboembolism prophylaxis, which should be continued.  He should continue carvedilol for heart rate control.  He is currently on amiodarone to reduce his burden of atrial fibrillation.   He is on amiodarone for maintenance of sinus rhythm.  ECG done today showed sinus rhythm with a heart rate of 82 bpm.   He denies chest pain, palpitations or lightheadedness.   Echocardiogram done in September 2023 showed mildly reduced left ventricular systolic function with an ejection fraction of 45 to 50%, dilated right ventricle with normal right ventricular systolic function, biatrial dilatation, mild dilatation of the ascending aorta measured at 4.0 cm.  Recent lab works as noted in the HPI.   Lab works as noted below will be done in 6 months prior to his next visit.   I will refer him to EP cardiology for further evaluation and management.   I will also refer him to Endocrinology due to fluctuation of his thyroid function.   Echocardiogram, chest x-ray and were ordered as noted below.   Follow up in 6 months and sooner if necessary.      2. Anticoagulation with apixaban  The patient is on anticoagulation with apixaban for paroxysmal atrial fibrillation.  Recent lab works as noted in the HPI.  Lab works as noted below will be done in 6 months prior to his next visit.      3. Antiarrhythmic drug therapy with amiodarone   The patient is on antiarrhythmic drug therapy with amiodarone for paroxysmal atrial fibrillation.  Recent lab works as noted in the HPI.  Lab works will be done today and again in 6 months prior to the next visit.   Chest x-ray was ordered as noted below.   I  will also refer him to Endocrinology due to fluctuating thyroid function.   I will also refer him to EP cardiology for further evaluation and management.     4. Cardiomyopathy  The patient has a history of cardiomyopathy with ejection fraction zully of 20% now improved to 50%.  Echocardiogram done in October 2020 showed mildly reduced left ventricular systolic function with an ejection fraction of 50 to 55% and grade 1 diastolic dysfunction.  He does not appear significantly volume overloaded on exam today.  He should continue his current cardiac medications.  Recent lab works as noted in the HPI.  Echocardiogram was ordered as noted below.   Lab works as noted below will be done in 6 months prior to his next visit.   I discussed with him the importance of following a low-sodium heart healthy diet as well as weight loss.   Follow up in 6 months and sooner if necessary.      5. Hypertension  The patient has a history of hypertension which appears controlled on exam today.  He should continue his current antihypertensive medications and monitor his blood pressure at home.    6. Ascending aortic aneurysm  Echocardiogram done in September 2023 showed mildly reduced left ventricular systolic function with an ejection fraction of 45 to 50%, dilated right ventricle with normal right ventricular systolic function, biatrial dilatation, mild dilatation of the ascending aorta measured at 4.0 cm.   Echocardiogram was ordered as noted below.  Continue risk factor modification.     7. Obstructive sleep apnea  Management as per PCP.     8. Obesity  Please see lifestyle recommendations below.      Orders:   Referral to EP cardiology  Referral to Endocrinology  CMP/lipid/magnesium/CBC/TSH in 6 months  Chest x-ray,   Echocardiogram,   Follow-up in 6 months    Lifestyle Recommendations  I recommend a whole-food plant-based diet, an eating pattern that encourages the consumption of unrefined plant foods (such as fruits, vegetables,  tubers, whole grains, legumes, nuts and seeds) and discourages meats, dairy products, eggs and processed foods.     The AHA/ACC recommends that the patient consume a dietary pattern that emphasizes intake of vegetables, fruits, and whole grains; includes low-fat dairy products, poultry, fish, legumes, non-tropical vegetable oils, and nuts; and limits intake of sodium, sweets, sugar-sweetened beverages, and red meats.  Adapt this dietary pattern to appropriate calorie requirements (a 500-750 kcal/day deficit to loose weight), personal and cultural food preferences, and nutrition therapy for other medical conditions (including diabetes).  Achieve this pattern by following plans such as the [place] Mediterranean, DASH dietary pattern, or AHA diet.     Engage in 2 hours and 30 minutes per week of moderate-intensity physical activity, or 1 hour and 15 minutes (75 minutes) per week of vigorous-intensity aerobic physical activity, or an equivalent combination of moderate and vigorous-intensity aerobic physical activity. Aerobic activity should be performed in episodes of at least 10 minutes preferably spread throughout the week.     Adhering to a heart healthy diet, regular exercise habits, avoidance of tobacco products, and maintenance of a healthy weight are crucial components of their heart disease risk reduction.     Any positive review of systems not specifically addressed in the office visit today should be evaluated and treated by the patients primary care physician or in an emergency department if necessary     Patient was notified that results from ordered tests will be called to the patient if it changes current management; it will otherwise be discussed at a future appointment and available on  [employer]hart.     Thank you for allowing me to participate in the care of this patient.        This document was generated using the assistance of voice recognition software. If there are any errors of spelling, grammar,  syntax, or meaning; please feel free to contact me directly for clarification.    Past Medical History:  He has a past medical history of Personal history of other diseases of the respiratory system (01/03/2018), Personal history of other diseases of the respiratory system (10/30/2019), and Unspecified foreign body in bronchus causing asphyxiation, initial encounter (10/01/2020).    Past Surgical History:  He has a past surgical history that includes Other surgical history (05/21/2020) and Other surgical history (05/20/2020).      Social History:  He reports that he has never smoked. He has never used smokeless tobacco. He reports current alcohol use. He reports that he does not use drugs.    Family History:  Family History   Problem Relation Name Age of Onset    Hypertension Mother          Allergies:  Patient has no known allergies.    Outpatient Medications:  Current Outpatient Medications   Medication Instructions    albuterol (Ventolin HFA) 90 mcg/actuation inhaler 2 puffs, inhalation, Every 4 hours PRN    albuterol 2.5 mg, Every 4 hours PRN    amiodarone (Pacerone) 200 mg tablet TAKE 1 TABLET BY MOUTH DAILY    azelastine (Astelin) 137 mcg (0.1 %) nasal spray 2 sprays, 2 times daily    carvedilol (Coreg) 25 mg tablet TAKE 1 TABLET (25 MG) BY MOUTH TWICE A DAY WITH MEALS    Eliquis 5 mg, oral, 2 times daily    fluticasone (Flonase) 50 mcg/actuation nasal spray 2 sprays, Each Nostril, Daily before breakfast    lisinopril 20 mg tablet TAKE 1 TABLET BY MOUTH TWO TIMES A DAY    predniSONE (Deltasone) 10 mg tablet 30mg PO Qday for 3 days, 20mg PO Qday for 3 days, 10 mg PO Qday for 4 days    pseudoephedrine (SUDAFED) 30 mg, oral, Every 6 hours PRN    spironolactone (Aldactone) 25 mg tablet TAKE 1 TABLET DAILY.    Wixela Inhub 250-50 mcg/dose diskus inhaler 1 puff, inhalation, 2 times daily RT        ROS:  A 14 point review of systems was done and is negative other than as stated in HPI    Vitals:      1/21/2022     "11:04 AM 4/25/2022     2:48 PM 2/15/2023    12:39 PM 3/13/2023     9:30 AM 8/8/2023    11:52 AM 8/22/2023     4:05 PM 11/20/2024     9:59 AM   Vitals   Systolic 150 157 140  100 120    Diastolic 100 117 92  62 80    Heart Rate 58 70 78  69 114    Temp     36.2 °C (97.2 °F)     Resp 16 17        Height 1.88 m (6' 2\") 1.88 m (6' 2\") 1.88 m (6' 2\") 1.905 m (6' 3\")  1.905 m (6' 3\") 1.905 m (6' 3\")   Weight (lb) 294 307.44 323 323 288 283 301.2   BMI 37.75 kg/m2 39.47 kg/m2 41.47 kg/m2 40.37 kg/m2 36 kg/m2 35.37 kg/m2 37.65 kg/m2   BSA (m2) 2.64 m2 2.69 m2 2.77 m2 2.79 m2 2.63 m2 2.6 m2 2.69 m2        Physical Exam:   Constitutional: Cooperative, in no acute distress, alert, appears stated age.  Skin: Skin color, texture, turgor normal. No rashes or lesions.  Head: Normocephalic. No masses, lesions, tenderness or abnormalities  Eyes: Extraocular movements are grossly intact.  Mouth and throat: Mucous membranes moist  Neck: Neck supple, no carotid bruits, no JVD  Respiratory: Lungs clear to auscultation, no wheezing or rhonchi, no use of accessory muscles  Chest wall: No scars, normal excursion with respiration  Cardiovascular: Regular rhythm without murmur  Gastrointestinal: Abdomen soft, nontender. Bowel sounds normal. Obese  Musculoskeletal: Strength equal in upper extremities  Extremities: No pitting edema  Neurologic: Sensation grossly intact, alert and oriented x3    Intake/Output:   No intake/output data recorded.    Outpatient Medications  Current Outpatient Medications on File Prior to Visit   Medication Sig Dispense Refill    albuterol (Ventolin HFA) 90 mcg/actuation inhaler INHALE 2 PUFFS EVERY 4 HOURS IF NEEDED FOR WHEEZING OR SHORTNESS OF BREATH. 18 g 1    albuterol 2.5 mg /3 mL (0.083 %) nebulizer solution Take 3 mL (2.5 mg) by nebulization every 4 hours if needed.      amiodarone (Pacerone) 200 mg tablet TAKE 1 TABLET BY MOUTH DAILY (Patient not taking: Reported on 11/20/2024) 60 tablet 0    apixaban " (Eliquis) 5 mg tablet Take 1 tablet (5 mg) by mouth 2 times a day. 180 tablet 3    azelastine (Astelin) 137 mcg (0.1 %) nasal spray Administer 2 sprays into each nostril 2 times a day.      carvedilol (Coreg) 25 mg tablet TAKE 1 TABLET (25 MG) BY MOUTH TWICE A DAY WITH MEALS 180 tablet 3    [] doxycycline (Monodox) 100 mg capsule Take 1 capsule (100 mg) by mouth 2 times a day for 10 days. Take with at least 8 ounces (large glass) of water, do not lie down for 30 minutes after 20 capsule 0    fluticasone (Flonase) 50 mcg/actuation nasal spray USE 2 SPRAYS IN EACH NOSTRIL EVERY MORNING (Patient not taking: Reported on 2024) 48 mL 1    lisinopril 20 mg tablet TAKE 1 TABLET BY MOUTH TWO TIMES A  tablet 1    predniSONE (Deltasone) 10 mg tablet 30mg PO Qday for 3 days, 20mg PO Qday for 3 days, 10 mg PO Qday for 4 days 19 tablet 0    pseudoephedrine (Sudafed) 30 mg tablet Take 1 tablet (30 mg) by mouth every 6 hours if needed for congestion for up to 3 days. 6 tablet 0    spironolactone (Aldactone) 25 mg tablet TAKE 1 TABLET DAILY. 90 tablet 1    Wixela Inhub 250-50 mcg/dose diskus inhaler Inhale 1 puff 2 times a day. (Patient not taking: Reported on 2024) 60 each 5     No current facility-administered medications on file prior to visit.       Labs: (past 26 weeks)  No results found for this or any previous visit (from the past 26 weeks).    ECG  No results found for this or any previous visit (from the past 4464 hours).    Echocardiogram  No results found for this or any previous visit from the past 1095 days.      CV Studies:  EKG:No results found for this or any previous visit (from the past 4464 hours).  Echocardiogram:   Echocardiogram     [place] [id number]  Phone [phone] Fax [phone]    TRANSTHORACIC ECHOCARDIOGRAM REPORT      Patient Name:     BRAYAN HICKEY   [place] Physician:  [id number] Sameer Malin MD  III  Study  Date:       9/19/2023            Referring           KRISTI OLIVIA  Physician:  MRN/PID:          [id number]             PCP:  Accession/Order#: [id number]         Department          [facility][1]I Echo  Location:           Lab  YOB: 1975            Fellow:  Gender:           M                    Nurse:  Admit Date:                            Sonographer:        Sofia West [facility]  Admission Status: Outpatient           Additional Staff:  Height:           187.00 cm            CC Report to:  Weight:           131.00 kg            Study Type:         Echocardiogram  BSA:              2.53 m2  Blood Pressure: 132 /91 mmHg    Diagnosis/ICD: I42.9-Cardiomyopathy, unspecified; I48.0-Paroxysmal atrial  fibrillation  Indication:    Cardiomyopathy, Afib  Procedure/CPT: Echo Complete w Full [id number]    Patient History:  Pertinent History: A-Fib.    Study Detail: The following Echo studies were performed: 2D, M-Mode, Doppler and  color flow. Technically challenging study due to body habitus and  prominent lung artifact.      PHYSICIAN INTERPRETATION:  Left Ventricle: Left ventricular systolic function is mildly decreased, with an estimated ejection fraction of 45-50%. There is global hypokinesis of the left ventricle with minor regional variations. The left ventricular cavity size is normal. There is mild to moderate left ventricular hypertrophy. Left ventricular diastolic filling was indeterminate.  Left Atrium: The left atrium is mild to moderately dilated.  Right Ventricle: The right ventricle is slightly enlarged. There is normal right ventricular global systolic function.  Right Atrium: The right atrium is mildly dilated.  Aortic Valve: The aortic valve appears structurally normal. There is no evidence of aortic valve regurgitation.  Mitral Valve: The mitral valve is normal in structure. There is trace mitral valve regurgitation.  Tricuspid Valve: The tricuspid valve is structurally normal. No  evidence of tricuspid regurgitation.  Pulmonic Valve: The pulmonic valve is structurally normal. There is no indication of pulmonic valve regurgitation.  Pericardium: There is no pericardial effusion noted.  Aorta: The aortic root is normal. There is mild dilatation of the ascending aorta.      CONCLUSIONS:  1. Left ventricular systolic function is mildly decreased with a 45-50% estimated ejection fraction.  2. The left atrium is mild to moderately dilated.  3. There is global hypokinesis of the left ventricle with minor regional variations.    QUANTITATIVE DATA SUMMARY:  2D MEASUREMENTS:  Normal Ranges:  Ao Root d:     3.50 cm   (2.0-3.7cm)  LAs:           3.98 cm   (2.7-4.0cm)  IVSd:          1.45 cm   (0.6-1.1cm)  LVPWd:         1.43 cm   (0.6-1.1cm)  LVIDd:         3.82 cm   (3.9-5.9cm)  LVIDs:         2.93 cm  LV Mass Index: 80.6 g/m2  LV % FS        23.2 %    LA VOLUME:  Normal Ranges:  LA Vol A4C:        74.8 ml    (22+/-6mL/m2)  LA Vol A2C:        93.9 ml  LA Vol BP:         87.5 ml  LA Vol Index A4C:  29.6ml/m2  LA Vol Index A2C:  37.1 ml/m2  LA Vol Index BP:   34.6 ml/m2  LA Area A4C:       24.1 cm2  LA Area A2C:       28.2 cm2  LA Major Axis A4C: 6.6 cm  LA Major Axis A2C: 7.2 cm  LA Vol A4C:        72.0 ml  LA Vol A2C:        92.5 ml    RA VOLUME BY A/L METHOD:  Normal Ranges:  RA Area A4C: 21.6 cm2    AORTA MEASUREMENTS:  Normal Ranges:  Ao STJ, d: 3.10 cm (1.7-3.4cm)  Asc Ao, d: 4.00 cm (2.1-3.4cm)    LV SYSTOLIC FUNCTION BY 2D PLANIMETRY (MOD):  Normal Ranges:  EF-A4C View: 49.3 % (>=55%)  EF-A2C View: 48.1 %  EF-Biplane:  48.0 %    LV DIASTOLIC FUNCTION:  Normal Ranges:  MV Peak E:    0.66 m/s (0.7-1.2 m/s)  MV Peak A:    0.01 m/s (0.42-0.7 m/s)  E/A Ratio:    73.44    (1.0-2.2)  MV e'         0.16 m/s (>8.0)  MV lateral e' 0.18 m/s  MV medial e'  0.13 m/s  E/e' Ratio:   4.27     (<8.0)    MITRAL VALVE:  Normal Ranges:  MV DT: 154 msec (150-240msec)    AORTIC VALVE:  Normal Ranges:  LVOT Max Jorge:   0.94 m/s (<=1.1m/s)  LVOT VTI:      15.24 cm  LVOT Diameter: 2.41 cm  (1.8-2.4cm)      RIGHT VENTRICLE:  RV Basal 4.25 cm  RV Mid   3.70 cm  RV Major 7.3 cm  TAPSE:   17.4 mm  RV s'    0.16 m/s    TRICUSPID VALVE/RVSP:  Normal Ranges:  IVC Diam: 1.83 cm  TV e'     0.1 m/s    AORTA:  Asc Ao Diam 4.03 cm      [id number] Sameer Malin MD  Electronically signed on 9/19/2023 at 2:02:54 PM         Final     Stress Testing IMGRESULT(PEK8800:1:1825): No results found for this or any previous visit from the past 1825 days.    Cardiac Catheterization: No results found for this or any previous visit from the past 1825 days.  No results found for this or any previous visit from the past 3650 days.     Cardiac Scoring: No results found for this or any previous visit from the past 1825 days.    AAA : No results found for this or any previous visit from the past 1825 days.    OTHER: No results found for this or any previous visit from the past 1825 days.    LAST IMAGING RESULTS  [place] [address]  Phone [phone] Fax [phone]    TRANSTHORACIC ECHOCARDIOGRAM REPORT    Patient Name:     BRAYAN Abernathy Physician:  [id number] Sameer Malin MD  III  Study Date:       9/19/2023            Referring           KRISTI BAKER  Physician:  MRN/PID:          [id number]             PCP:  Accession/Order#: [id number]         [facility] Echo  Location:           Lab  YOB: 1975            Fellow:  Gender:           M                    Nurse:  Admit Date:                            Sonographer:        Sofia West JOSUE  Admission Status: Outpatient           Additional Staff:  Height:           187.00 cm            CC Report to:  Weight:           131.00 kg            Study Type:         Echocardiogram  BSA:              2.53 m2  Blood Pressure: 132 /91 mmHg    Diagnosis/ICD: I42.9-Cardiomyopathy, unspecified; I48.0-Paroxysmal  atrial  fibrillation  Indication:    Cardiomyopathy, Afib  Procedure/CPT: Echo Complete w Full Doppler-[id number]    Patient History:  Pertinent History: A-Fib.    Study Detail: The following Echo studies were performed: 2D, M-Mode, Doppler and  color flow. Technically challenging study due to body habitus and  prominent lung artifact.    PHYSICIAN INTERPRETATION:  Left Ventricle: Left ventricular systolic function is mildly decreased, with an estimated ejection fraction of 45-50%. There is global hypokinesis of the left ventricle with minor regional variations. The left ventricular cavity size is normal. There is mild to moderate left ventricular hypertrophy. Left ventricular diastolic filling was indeterminate.  Left Atrium: The left atrium is mild to moderately dilated.  Right Ventricle: The right ventricle is slightly enlarged. There is normal right ventricular global systolic function.  Right Atrium: The right atrium is mildly dilated.  Aortic Valve: The aortic valve appears structurally normal. There is no evidence of aortic valve regurgitation.  Mitral Valve: The mitral valve is normal in structure. There is trace mitral valve regurgitation.  Tricuspid Valve: The tricuspid valve is structurally normal. No evidence of tricuspid regurgitation.  Pulmonic Valve: The pulmonic valve is structurally normal. There is no indication of pulmonic valve regurgitation.  Pericardium: There is no pericardial effusion noted.  Aorta: The aortic root is normal. There is mild dilatation of the ascending aorta.    CONCLUSIONS:  1. Left ventricular systolic function is mildly decreased with a 45-50% estimated ejection fraction.  2. The left atrium is mild to moderately dilated.  3. There is global hypokinesis of the left ventricle with minor regional variations.    QUANTITATIVE DATA SUMMARY:  2D MEASUREMENTS:  Normal Ranges:  Ao Root d:     3.50 cm   (2.0-3.7cm)  LAs:           3.98 cm   (2.7-4.0cm)  IVSd:          1.45 cm    (0.6-1.1cm)  LVPWd:         1.43 cm   (0.6-1.1cm)  LVIDd:         3.82 cm   (3.9-5.9cm)  LVIDs:         2.93 cm  LV Mass Index: 80.6 g/m2  LV % FS        23.2 %    LA VOLUME:  Normal Ranges:  LA Vol A4C:        74.8 ml    (22+/-6mL/m2)  LA Vol A2C:        93.9 ml  LA Vol BP:         87.5 ml  LA Vol Index A4C:  29.6ml/m2  LA Vol Index A2C:  37.1 ml/m2  LA Vol Index BP:   34.6 ml/m2  LA Area A4C:       24.1 cm2  LA Area A2C:       28.2 cm2  LA Major Axis A4C: 6.6 cm  LA Major Axis A2C: 7.2 cm  LA Vol A4C:        72.0 ml  LA Vol A2C:        92.5 ml    RA VOLUME BY A/L METHOD:  Normal Ranges:  RA Area A4C: 21.6 cm2    AORTA MEASUREMENTS:  Normal Ranges:  Ao STJ, d: 3.10 cm (1.7-3.4cm)  Asc Ao, d: 4.00 cm (2.1-3.4cm)    LV SYSTOLIC FUNCTION BY 2D PLANIMETRY (MOD):  Normal Ranges:  EF-A4C View: 49.3 % (>=55%)  EF-A2C View: 48.1 %  EF-Biplane:  48.0 %    LV DIASTOLIC FUNCTION:  Normal Ranges:  MV Peak E:    0.66 m/s (0.7-1.2 m/s)  MV Peak A:    0.01 m/s (0.42-0.7 m/s)  E/A Ratio:    73.44    (1.0-2.2)  MV e'         0.16 m/s (>8.0)  MV lateral e' 0.18 m/s  MV medial e'  0.13 m/s  E/e' Ratio:   4.27     (<8.0)    MITRAL VALVE:  Normal Ranges:  MV DT: 154 msec (150-240msec)    AORTIC VALVE:  Normal Ranges:  LVOT Max Jorge:  0.94 m/s (<=1.1m/s)  LVOT VTI:      15.24 cm  LVOT Diameter: 2.41 cm  (1.8-2.4cm)    RIGHT VENTRICLE:  RV Basal 4.25 cm  RV Mid   3.70 cm  RV Major 7.3 cm  TAPSE:   17.4 mm  RV s'    0.16 m/s    TRICUSPID VALVE/RVSP:  Normal Ranges:  IVC Diam: 1.83 cm  TV e'     0.1 m/s    AORTA:  Asc Ao Diam 4.03 cm    [id number] Sameer Malin MD  Electronically signed on 9/19/2023 at 2:02:54 PM     Final       Problem List Items Addressed This Visit       Benign essential hypertension (Chronic)    Cardiomyopathy (Chronic)    Obstructive sleep apnea syndrome    Paroxysmal atrial fibrillation (Multi) - Primary (Chronic)    On apixaban therapy    Long term current use of amiodarone    Obesity (BMI 30-39.9)    Ascending aorta  dilatation (CMS-HCC)          Tony Mckeon DO, FACC, FACOI

## 2024-12-02 ENCOUNTER — LAB (OUTPATIENT)
Dept: LAB | Facility: LAB | Age: 49
End: 2024-12-02
Payer: COMMERCIAL

## 2024-12-02 DIAGNOSIS — Z51.81 ENCOUNTER FOR MONITORING AMIODARONE THERAPY: ICD-10-CM

## 2024-12-02 DIAGNOSIS — Z79.899 ENCOUNTER FOR MONITORING AMIODARONE THERAPY: ICD-10-CM

## 2024-12-02 LAB
ALT SERPL W P-5'-P-CCNC: 11 U/L (ref 10–52)
ANION GAP SERPL CALC-SCNC: 16 MMOL/L (ref 10–20)
AST SERPL W P-5'-P-CCNC: 20 U/L (ref 9–39)
BUN SERPL-MCNC: 19 MG/DL (ref 6–23)
CALCIUM SERPL-MCNC: 9.5 MG/DL (ref 8.6–10.3)
CHLORIDE SERPL-SCNC: 103 MMOL/L (ref 98–107)
CO2 SERPL-SCNC: 26 MMOL/L (ref 21–32)
CREAT SERPL-MCNC: 1.19 MG/DL (ref 0.5–1.3)
EGFRCR SERPLBLD CKD-EPI 2021: 74 ML/MIN/1.73M*2
ERYTHROCYTE [DISTWIDTH] IN BLOOD BY AUTOMATED COUNT: 13.3 % (ref 11.5–14.5)
GLUCOSE SERPL-MCNC: 115 MG/DL (ref 74–99)
HCT VFR BLD AUTO: 51.1 % (ref 41–52)
HGB BLD-MCNC: 16.6 G/DL (ref 13.5–17.5)
MCH RBC QN AUTO: 32 PG (ref 26–34)
MCHC RBC AUTO-ENTMCNC: 32.5 G/DL (ref 32–36)
MCV RBC AUTO: 99 FL (ref 80–100)
NRBC BLD-RTO: 0 /100 WBCS (ref 0–0)
PLATELET # BLD AUTO: 290 X10*3/UL (ref 150–450)
POTASSIUM SERPL-SCNC: 4.6 MMOL/L (ref 3.5–5.3)
RBC # BLD AUTO: 5.18 X10*6/UL (ref 4.5–5.9)
SODIUM SERPL-SCNC: 140 MMOL/L (ref 136–145)
TSH SERPL-ACNC: 3.12 MIU/L (ref 0.44–3.98)
WBC # BLD AUTO: 11 X10*3/UL (ref 4.4–11.3)

## 2024-12-02 PROCEDURE — 84460 ALANINE AMINO (ALT) (SGPT): CPT

## 2024-12-02 PROCEDURE — 36415 COLL VENOUS BLD VENIPUNCTURE: CPT

## 2024-12-02 PROCEDURE — 85027 COMPLETE CBC AUTOMATED: CPT

## 2024-12-02 PROCEDURE — 84443 ASSAY THYROID STIM HORMONE: CPT

## 2024-12-02 PROCEDURE — 84450 TRANSFERASE (AST) (SGOT): CPT

## 2024-12-02 PROCEDURE — 80048 BASIC METABOLIC PNL TOTAL CA: CPT

## 2024-12-03 ENCOUNTER — APPOINTMENT (OUTPATIENT)
Dept: CARDIOLOGY | Facility: CLINIC | Age: 49
End: 2024-12-03
Payer: COMMERCIAL

## 2024-12-03 VITALS
SYSTOLIC BLOOD PRESSURE: 128 MMHG | DIASTOLIC BLOOD PRESSURE: 74 MMHG | HEART RATE: 82 BPM | HEIGHT: 75 IN | WEIGHT: 301.2 LBS | BODY MASS INDEX: 37.45 KG/M2

## 2024-12-03 DIAGNOSIS — G47.33 OBSTRUCTIVE SLEEP APNEA SYNDROME: ICD-10-CM

## 2024-12-03 DIAGNOSIS — I10 BENIGN ESSENTIAL HYPERTENSION: Chronic | ICD-10-CM

## 2024-12-03 DIAGNOSIS — I77.810 ASCENDING AORTA DILATATION (CMS-HCC): ICD-10-CM

## 2024-12-03 DIAGNOSIS — I42.9 CARDIOMYOPATHY, UNSPECIFIED TYPE (MULTI): Chronic | ICD-10-CM

## 2024-12-03 DIAGNOSIS — Z79.899 LONG TERM CURRENT USE OF AMIODARONE: ICD-10-CM

## 2024-12-03 DIAGNOSIS — E66.9 OBESITY (BMI 30-39.9): ICD-10-CM

## 2024-12-03 DIAGNOSIS — Z79.01 ON APIXABAN THERAPY: ICD-10-CM

## 2024-12-03 DIAGNOSIS — I48.0 PAROXYSMAL ATRIAL FIBRILLATION (MULTI): Primary | Chronic | ICD-10-CM

## 2024-12-03 PROCEDURE — 3008F BODY MASS INDEX DOCD: CPT | Performed by: INTERNAL MEDICINE

## 2024-12-03 PROCEDURE — 93000 ELECTROCARDIOGRAM COMPLETE: CPT | Performed by: INTERNAL MEDICINE

## 2024-12-03 PROCEDURE — 99214 OFFICE O/P EST MOD 30 MIN: CPT | Performed by: INTERNAL MEDICINE

## 2024-12-03 PROCEDURE — 1036F TOBACCO NON-USER: CPT | Performed by: INTERNAL MEDICINE

## 2024-12-03 PROCEDURE — 3078F DIAST BP <80 MM HG: CPT | Performed by: INTERNAL MEDICINE

## 2024-12-03 PROCEDURE — 3074F SYST BP LT 130 MM HG: CPT | Performed by: INTERNAL MEDICINE

## 2024-12-20 DIAGNOSIS — I42.9 CARDIOMYOPATHY, UNSPECIFIED TYPE (MULTI): Chronic | ICD-10-CM

## 2024-12-20 DIAGNOSIS — I48.0 PAROXYSMAL ATRIAL FIBRILLATION (MULTI): Chronic | ICD-10-CM

## 2024-12-20 DIAGNOSIS — I48.91 ATRIAL FIBRILLATION, UNSPECIFIED TYPE (MULTI): ICD-10-CM

## 2024-12-23 ENCOUNTER — PHARMACY VISIT (OUTPATIENT)
Dept: PHARMACY | Facility: CLINIC | Age: 49
End: 2024-12-23
Payer: COMMERCIAL

## 2024-12-23 PROCEDURE — RXMED WILLOW AMBULATORY MEDICATION CHARGE

## 2024-12-23 RX ORDER — SPIRONOLACTONE 25 MG/1
25 TABLET ORAL DAILY
Qty: 90 TABLET | Refills: 1 | Status: SHIPPED | OUTPATIENT
Start: 2024-12-23 | End: 2025-12-23

## 2024-12-23 RX ORDER — AMIODARONE HYDROCHLORIDE 200 MG/1
200 TABLET ORAL DAILY
Qty: 180 TABLET | Refills: 1 | Status: SHIPPED | OUTPATIENT
Start: 2024-12-23 | End: 2025-12-23

## 2025-01-08 ENCOUNTER — TELEMEDICINE (OUTPATIENT)
Dept: PRIMARY CARE | Facility: CLINIC | Age: 50
End: 2025-01-08
Payer: COMMERCIAL

## 2025-01-08 DIAGNOSIS — J45.40 MODERATE PERSISTENT ASTHMA WITHOUT COMPLICATION (HHS-HCC): Chronic | ICD-10-CM

## 2025-01-08 DIAGNOSIS — J06.9 UPPER RESPIRATORY TRACT INFECTION, UNSPECIFIED TYPE: Primary | ICD-10-CM

## 2025-01-08 PROCEDURE — 99212 OFFICE O/P EST SF 10 MIN: CPT

## 2025-01-08 PROCEDURE — 1036F TOBACCO NON-USER: CPT

## 2025-01-08 RX ORDER — AZITHROMYCIN 250 MG/1
TABLET, FILM COATED ORAL
Qty: 6 TABLET | Refills: 0 | Status: SHIPPED | OUTPATIENT
Start: 2025-01-08 | End: 2025-01-13

## 2025-01-08 RX ORDER — ALBUTEROL SULFATE 90 UG/1
2 INHALANT RESPIRATORY (INHALATION) EVERY 4 HOURS PRN
Qty: 18 G | Refills: 1 | Status: SHIPPED | OUTPATIENT
Start: 2025-01-08

## 2025-01-08 RX ORDER — METHYLPREDNISOLONE 4 MG/1
TABLET ORAL
Qty: 1 EACH | Refills: 0 | Status: SHIPPED | OUTPATIENT
Start: 2025-01-08

## 2025-01-08 RX ORDER — BENZONATATE 200 MG/1
200 CAPSULE ORAL 3 TIMES DAILY PRN
Qty: 42 CAPSULE | Refills: 0 | Status: SHIPPED | OUTPATIENT
Start: 2025-01-08 | End: 2025-02-07

## 2025-01-08 NOTE — PROGRESS NOTES
This visit was completed via video conference. All issues as below were discussed and addressed but no physical exam was performed. If it was felt that the patient should be evaluated in clinic than they were directed there. The patient verbally consented to the visit.    An interactive audio and video telecommunication system which permits real time communications between the patient (at the originating site) and provider (at the distant site) was utilized to provide this telehealth service.     Verbal consent was requested and obtained from Donavan Ordaz III on this date, 01/08/25 for a telehealth visit.     I have verbally confirmed with Donavan Ordaz III(or parent if under 18) that they are physically located in the [facility] during this virtual visit.    Subjective   Francisco Ordaz III is a 49 y.o. male who presents for evaluation of symptoms of a URI. Symptoms include congestion, no  fever, post nasal drip, productive cough with  green colored sputum, purulent nasal discharge, sore throat, and wheezing. Onset of symptoms was 9 days ago and has been gradually worsening since that time. Treatment to date: decongestants and nasal steroids. He does have a history of mild asthma and CASSIE, he is currently out of his albuterol inhaler.  CASSIE    Objective   Physical Exam   NO VS or PE completed for this virtual visit.     Assessment/Plan   #URI  Azithromycin  Medrol dose pack  Tessalon Perles  Albuterol inhaler refilled per request.     Discussed diagnosis and treatment of URI.  Suggested symptomatic OTC remedies.  Nasal saline spray for congestion.  Zithromax per orders.  Follow up as needed.  Follow up in 5 days if symptoms are not improving,   Seek attention from ED if fever develops, or if you have chest pain, or shortness of breath.

## 2025-01-08 NOTE — PATIENT INSTRUCTIONS
Cold medications to avoid  Anything containing    Pseudoephedrine  Ephedrine  Phenylephrine      SAFE:  Coricidin  Mucinex  Allegra  Claritin  Zyrtec  Robitussin Cough and cold  Delsym

## 2025-01-13 ENCOUNTER — APPOINTMENT (OUTPATIENT)
Dept: OTOLARYNGOLOGY | Facility: CLINIC | Age: 50
End: 2025-01-13
Payer: COMMERCIAL

## 2025-01-20 ENCOUNTER — APPOINTMENT (OUTPATIENT)
Dept: PRIMARY CARE | Facility: CLINIC | Age: 50
End: 2025-01-20
Payer: COMMERCIAL

## 2025-01-20 VITALS — SYSTOLIC BLOOD PRESSURE: 126 MMHG | HEART RATE: 60 BPM | DIASTOLIC BLOOD PRESSURE: 84 MMHG | TEMPERATURE: 97 F

## 2025-01-20 DIAGNOSIS — J01.41 ACUTE RECURRENT PANSINUSITIS: ICD-10-CM

## 2025-01-20 DIAGNOSIS — Z12.11 SCREEN FOR COLON CANCER: ICD-10-CM

## 2025-01-20 DIAGNOSIS — J45.41 MODERATE PERSISTENT ASTHMA WITH ACUTE EXACERBATION (HHS-HCC): Primary | ICD-10-CM

## 2025-01-20 PROCEDURE — 1036F TOBACCO NON-USER: CPT | Performed by: FAMILY MEDICINE

## 2025-01-20 PROCEDURE — 99214 OFFICE O/P EST MOD 30 MIN: CPT | Performed by: FAMILY MEDICINE

## 2025-01-20 PROCEDURE — 3079F DIAST BP 80-89 MM HG: CPT | Performed by: FAMILY MEDICINE

## 2025-01-20 PROCEDURE — 3074F SYST BP LT 130 MM HG: CPT | Performed by: FAMILY MEDICINE

## 2025-01-20 RX ORDER — AMOXICILLIN AND CLAVULANATE POTASSIUM 875; 125 MG/1; MG/1
875 TABLET, FILM COATED ORAL 2 TIMES DAILY
Qty: 20 TABLET | Refills: 0 | Status: SHIPPED | OUTPATIENT
Start: 2025-01-20 | End: 2025-01-30

## 2025-01-20 RX ORDER — PREDNISONE 20 MG/1
40 TABLET ORAL DAILY
Qty: 10 TABLET | Refills: 0 | Status: SHIPPED | OUTPATIENT
Start: 2025-01-20 | End: 2025-01-25

## 2025-01-20 RX ORDER — FLUTICASONE PROPIONATE AND SALMETEROL 250; 50 UG/1; UG/1
1 POWDER RESPIRATORY (INHALATION)
Qty: 60 EACH | Refills: 5 | Status: SHIPPED | OUTPATIENT
Start: 2025-01-20

## 2025-01-20 ASSESSMENT — ENCOUNTER SYMPTOMS
COUGH: 1
SINUS PRESSURE: 1
SORE THROAT: 0
FATIGUE: 1
WHEEZING: 1
FEVER: 0

## 2025-01-20 NOTE — PROGRESS NOTES
Subjective   Patient ID: Francisco Ordaz III is a 49 y.o. male who presents for Nasal Congestion (Discuss sinus pressure, congestion x2 weeks) and Cough (Productive cough, chest congestion ).    Francisco has been sick for the past 3 weeks. Having sinus pressure, congestion and cough. Some wheezing and shortness of breath. He has been out of his daily asthma inhaler. He did have a teledoc visit and was prescribed azithromycin two weeks ago. His symptoms improved mildly after treatment, but sinus pressure is persistent.          Review of Systems   Constitutional:  Positive for fatigue. Negative for fever.   HENT:  Positive for congestion and sinus pressure. Negative for sore throat.    Respiratory:  Positive for cough and wheezing.        Objective   /84   Pulse 60   Temp 36.1 °C (97 °F)     Physical Exam  Constitutional:       General: He is not in acute distress.     Appearance: Normal appearance. He is not toxic-appearing.   HENT:      Head: Normocephalic.      Right Ear: Tympanic membrane normal.      Left Ear: Tympanic membrane normal.      Nose: Congestion and rhinorrhea present.      Mouth/Throat:      Mouth: Mucous membranes are moist.   Eyes:      Extraocular Movements: Extraocular movements intact.      Conjunctiva/sclera: Conjunctivae normal.   Cardiovascular:      Rate and Rhythm: Normal rate and regular rhythm.      Heart sounds: No murmur heard.  Pulmonary:      Effort: No respiratory distress.      Breath sounds: No wheezing, rhonchi or rales.   Musculoskeletal:      Cervical back: Neck supple.   Skin:     General: Skin is warm and dry.   Neurological:      Mental Status: He is alert.   Psychiatric:         Mood and Affect: Mood normal.         Behavior: Behavior normal.         Assessment/Plan   Diagnoses and all orders for this visit:  Moderate persistent asthma with acute exacerbation ([facility])  Comments:  Restart daily inhaler. Rx for 5-day prednisone burst.  Orders:  -     Wixela Inhub 250-50  mcg/dose diskus inhaler; Inhale 1 puff 2 times a day.  -     predniSONE (Deltasone) 20 mg tablet; Take 2 tablets (40 mg) by mouth once daily for 5 days.  Acute recurrent pansinusitis  Comments:  Due to recurrence, start 10-day course of Augmentin.  Orders:  -     amoxicillin-pot clavulanate (Augmentin) 875-125 mg tablet; Take 1 tablet (875 mg) by mouth 2 times a day for 10 days.  Screen for colon cancer  -     Cologuard® colon cancer screening; Future

## 2025-01-27 PROCEDURE — RXMED WILLOW AMBULATORY MEDICATION CHARGE

## 2025-02-04 ENCOUNTER — PHARMACY VISIT (OUTPATIENT)
Dept: PHARMACY | Facility: CLINIC | Age: 50
End: 2025-02-04
Payer: COMMERCIAL

## 2025-02-04 DIAGNOSIS — I48.91 ATRIAL FIBRILLATION, UNSPECIFIED TYPE (MULTI): ICD-10-CM

## 2025-02-04 PROCEDURE — RXMED WILLOW AMBULATORY MEDICATION CHARGE

## 2025-02-04 RX ORDER — CARVEDILOL 25 MG/1
TABLET ORAL
Qty: 180 TABLET | Refills: 3 | Status: CANCELLED | OUTPATIENT
Start: 2025-02-04

## 2025-02-04 RX ORDER — CARVEDILOL 25 MG/1
TABLET ORAL
Qty: 180 TABLET | Refills: 3 | Status: SHIPPED | OUTPATIENT
Start: 2025-02-04

## 2025-02-06 ENCOUNTER — TELEPHONE (OUTPATIENT)
Dept: PRIMARY CARE | Facility: CLINIC | Age: 50
End: 2025-02-06
Payer: COMMERCIAL

## 2025-02-06 NOTE — TELEPHONE ENCOUNTER
Pt has what he believes to be a cyst on the back of his neck and is asking if there's any way possible for him to be squeezed in tomorrow sometime with [facility]

## 2025-02-07 ENCOUNTER — OFFICE VISIT (OUTPATIENT)
Dept: PRIMARY CARE | Facility: CLINIC | Age: 50
End: 2025-02-07
Payer: COMMERCIAL

## 2025-02-07 VITALS
BODY MASS INDEX: 39.32 KG/M2 | WEIGHT: 314.6 LBS | DIASTOLIC BLOOD PRESSURE: 80 MMHG | SYSTOLIC BLOOD PRESSURE: 125 MMHG | HEART RATE: 66 BPM | TEMPERATURE: 97.5 F

## 2025-02-07 DIAGNOSIS — L02.11 CUTANEOUS ABSCESS OF NECK: Primary | ICD-10-CM

## 2025-02-07 PROCEDURE — 87070 CULTURE OTHR SPECIMN AEROBIC: CPT

## 2025-02-07 PROCEDURE — 99214 OFFICE O/P EST MOD 30 MIN: CPT | Performed by: FAMILY MEDICINE

## 2025-02-07 PROCEDURE — 3074F SYST BP LT 130 MM HG: CPT | Performed by: FAMILY MEDICINE

## 2025-02-07 PROCEDURE — 3079F DIAST BP 80-89 MM HG: CPT | Performed by: FAMILY MEDICINE

## 2025-02-07 PROCEDURE — 87205 SMEAR GRAM STAIN: CPT

## 2025-02-07 PROCEDURE — 87185 SC STD ENZYME DETCJ PER NZM: CPT

## 2025-02-07 PROCEDURE — 10060 I&D ABSCESS SIMPLE/SINGLE: CPT | Performed by: FAMILY MEDICINE

## 2025-02-07 PROCEDURE — 87075 CULTR BACTERIA EXCEPT BLOOD: CPT

## 2025-02-07 PROCEDURE — 1036F TOBACCO NON-USER: CPT | Performed by: FAMILY MEDICINE

## 2025-02-07 RX ORDER — CEPHALEXIN 500 MG/1
500 CAPSULE ORAL 2 TIMES DAILY
Qty: 14 CAPSULE | Refills: 0 | Status: SHIPPED | OUTPATIENT
Start: 2025-02-07 | End: 2025-02-14

## 2025-02-07 ASSESSMENT — ENCOUNTER SYMPTOMS
COUGH: 0
CHILLS: 0
FEVER: 0
NECK STIFFNESS: 0

## 2025-02-07 NOTE — PROGRESS NOTES
Subjective   Patient ID: Francisco Ordaz III is a 49 y.o. male who presents for Cyst (Mass on back of neck X 6 days ago, noticed drainage yesterday,  no pain ).    Francisco began noticing swelling on the back of his neck yesterday.  Swelling quickly became worse. He began having thick yellow drainage.  He has been having some itching in the area.  He has not had any fevers or chills.  No prior history of MRSA. He is leaving to travel for work on Tuesday.         Review of Systems   Constitutional:  Negative for chills and fever.   HENT:  Negative for congestion.    Respiratory:  Negative for cough.    Musculoskeletal:  Negative for neck stiffness.   Skin:         Swollen lump with discharge       Objective   /80 Comment: home bp  Pulse 66   Temp 36.4 °C (97.5 °F)   Wt 143 kg (314 lb 9.6 oz)   BMI 39.32 kg/m²     Physical Exam  Constitutional:       General: He is not in acute distress.     Appearance: Normal appearance.   HENT:      Head: Normocephalic.   Neck:      Comments: 7 cm abscess at subcutaneous tissue of posterior lower neck  Pulmonary:      Effort: Pulmonary effort is normal.   Musculoskeletal:      Cervical back: Neck supple. No rigidity.   Lymphadenopathy:      Cervical: No cervical adenopathy.   Neurological:      General: No focal deficit present.      Mental Status: He is alert.   Psychiatric:         Mood and Affect: Mood normal.         Abscess Incision and Drainage    Region: posterior lower neck    Indications: abscess    Consent: Risks, benefits, and alternatives discussed with patient. Patient informed and understands.    Preparation: Patient positioned appropriately. Skin prepped with alcohol. Clean technique maintained throughout.     Procedure: The patient was prepped and draped in a usual manner. The procedure began by infiltrating lidocaine with epinephrine around the abscess. Incision was made using #11 blade.  A 0.5 cm incision was made and purulent material expressed.      Complications: None; patient tolerated the procedure well.    Post-procedure counseling: Patient instructed to keep the wound dry and clean. Notify office if redness, swelling, increasing pain or fever. Recheck Monday          Assessment/Plan   Diagnoses and all orders for this visit:  Cutaneous abscess of neck  Comments:  I&D performed. Wound culture sent. Wound care instructions given. Start cephalexin. Return for recheck Monday before travel.  Orders:  -     cephalexin (Keflex) 500 mg capsule; Take 1 capsule (500 mg) by mouth 2 times a day for 7 days.  -     Tissue/Wound Culture/Smear

## 2025-02-07 NOTE — TELEPHONE ENCOUNTER
Please get more info. Is it red? Any drainage or pain at the site? If yes, should be seen today. Ok to put on my schedule; have him come at 12:00 that way can look at it then set up procedure while I see other 12:00 appt.

## 2025-02-09 ENCOUNTER — PHARMACY VISIT (OUTPATIENT)
Dept: PHARMACY | Facility: CLINIC | Age: 50
End: 2025-02-09
Payer: COMMERCIAL

## 2025-02-09 LAB
BACTERIA SPEC CULT: ABNORMAL
GRAM STN SPEC: ABNORMAL
GRAM STN SPEC: ABNORMAL

## 2025-02-10 ENCOUNTER — PATIENT MESSAGE (OUTPATIENT)
Dept: PRIMARY CARE | Facility: CLINIC | Age: 50
End: 2025-02-10

## 2025-02-10 ENCOUNTER — APPOINTMENT (OUTPATIENT)
Dept: PRIMARY CARE | Facility: CLINIC | Age: 50
End: 2025-02-10
Payer: COMMERCIAL

## 2025-02-10 LAB
B-LACTAMASE ORGANISM ISLT: NEGATIVE
BACTERIA SPEC CULT: ABNORMAL
GRAM STN SPEC: ABNORMAL
GRAM STN SPEC: ABNORMAL

## 2025-02-18 PROCEDURE — RXMED WILLOW AMBULATORY MEDICATION CHARGE

## 2025-02-24 ENCOUNTER — PHARMACY VISIT (OUTPATIENT)
Dept: PHARMACY | Facility: CLINIC | Age: 50
End: 2025-02-24
Payer: COMMERCIAL

## 2025-03-07 ENCOUNTER — TELEPHONE (OUTPATIENT)
Dept: CARDIOLOGY | Facility: HOSPITAL | Age: 50
End: 2025-03-07
Payer: COMMERCIAL

## 2025-03-07 NOTE — TELEPHONE ENCOUNTER
LVM for pt regarding EP referral. Dr. Garsia no longer traveling to [place] for clinic - switched appt to Dr. Bradford 3/11 2:30pm [place] and req pt call office to confirm this change.     Pt moved his appt to 4/14 via [employer].

## 2025-03-10 ENCOUNTER — TELEMEDICINE (OUTPATIENT)
Dept: PRIMARY CARE | Facility: CLINIC | Age: 50
End: 2025-03-10
Payer: COMMERCIAL

## 2025-03-10 DIAGNOSIS — J01.90 ACUTE NON-RECURRENT SINUSITIS, UNSPECIFIED LOCATION: ICD-10-CM

## 2025-03-10 DIAGNOSIS — J45.41 MODERATE PERSISTENT ASTHMA WITH ACUTE EXACERBATION (HHS-HCC): Primary | ICD-10-CM

## 2025-03-10 PROCEDURE — 99214 OFFICE O/P EST MOD 30 MIN: CPT | Performed by: FAMILY MEDICINE

## 2025-03-10 RX ORDER — AMOXICILLIN AND CLAVULANATE POTASSIUM 875; 125 MG/1; MG/1
1 TABLET, FILM COATED ORAL 2 TIMES DAILY
Qty: 14 TABLET | Refills: 0 | Status: SHIPPED | OUTPATIENT
Start: 2025-03-10 | End: 2025-03-17

## 2025-03-10 RX ORDER — PREDNISONE 20 MG/1
40 TABLET ORAL DAILY
Qty: 10 TABLET | Refills: 0 | Status: SHIPPED | OUTPATIENT
Start: 2025-03-10 | End: 2025-03-15

## 2025-03-10 ASSESSMENT — LIFESTYLE VARIABLES: HISTORY_OF_SMOKING: I HAVE NEVER SMOKED

## 2025-03-10 NOTE — PROGRESS NOTES
I performed this visit using real-time telehealth tools, including an audio/video OR telephone connection between the patient listed who was located in the [facility] and myself, Carli Winn (Board certified in the [facility]).At the start of the visit, I introduced myself as Dr. Ridley and verified the patients name, , and current physical location.  If they were currently outside of the state of [place], the visit was ended and the patient was referred to alternative means for evaluation and treatment.  The patient was made aware of the limitations of the telehealth visit.  They will not be physically examined and all issues may not be appropriate for a telehealth visit.  If necessary, an in-    In preparing for this visit and writing this note, I reviewed previous electronic medical records (labs, imaging and medical charts) of the patient available in the physician portal. Significant findings which helped in decision making are recorded in this encounter charting.  All allergies were reviewed with the patient and all medications reconciled verbally with the patient at the beginning oft the visit.    Chief complaint:     Sxs started ~2 WEEKS ago  Sinus sxs  Traveling a lot  Congestion--staying the same  Now with wheezing  Green mucous  Uses albuterol every few hours--helps with tightness but not the wheezing--   Pox 93% and HR 77--- (after visit after albuterol it was 98%)  Usually needs prednisone and antibiotics for this--  No fever chills aches  No GI sxs  It has been ~2 mos since last prednisone  Has drs appt in a few days for sinuses  Has not taken wixela in past several days-- thinks it makes him feel worse--  Was off it for awhile--   Used advair that worked well--    All other ROS (-)    General appearance:  Vitals available from patient? no  Alert, oriented, pleasant, in no apparent distress? yes  Answers questions appropriately? yes  Eyes clear? yes  Is patient in respiratory distress?  no  Throat exam: not available  Is patient coughing during consult: no  Audible wheezing noted? : no  Pt sounds congested?:  yes  Sniffing or rhinorrhea?:  yes  Frontal sinus TTP by palpation?  NO  Maxillary sinus TTP by palpation? NO  Tender neck LAD by pt exam?: no  Psychiatric: Affect normal? yes  Other relevant physical exam:    Assessment and Plan:    Sinusitis:  Sinus sxs for at least 7 days and getting worse or 10 days with no improvement  Augmentin 875mg twice a day for 7 days  Moderate persistent asthma  Prednisone burst as written  Wixela not helping and he feels worse after taking it--discussed switching to another ICS/LABA--     Discussed with patient during visit  differential diagnosis; viral versus bacterial infection;  (if relevant); use of medications prescribed and possible side effects; appropriate over-the-counter medications; possible complications ; when to follow-up for in person evaluation.  All patient's questions were answered. Patient has good decision making capacity.  They are alert to person, place, time and situation.  Patient has the ability to communicate choice, understand information, consequences, and reason rationally.  If sent for in person care at  or ED,    I explained why Urgent in person exam was necessary and that failure to have further evaluation, and treatment today may lead to, negative outcomes, permanent disability, or even death.   If not sent for in person care today,   follow-up with your PCP in 2-3 days, sooner if not  improving.    Follow-up immediately if symptoms worsen.   If experiencing symptoms including but not limited to lethargy / chest pain / weakness / dizziness / difficulty breathing please call 911 or go to the closest emergency department for immediate care.   Limitations to telemedicine include inability to do a complete and accurate physical exam.    Any concerns regarding this were conveyed with the patient and in person follow-up recommended if  the nature of their concern/illness does not progress as anticipated during this visit.

## 2025-03-11 ENCOUNTER — APPOINTMENT (OUTPATIENT)
Dept: CARDIOLOGY | Facility: HOSPITAL | Age: 50
End: 2025-03-11
Payer: COMMERCIAL

## 2025-03-11 NOTE — PROGRESS NOTES
"  Subjective   Patient ID:   [id number]   Donavan Ordaz III \"Francisco\" is a 50 y.o. male who presents for Nasal Polyps (Nasal polyps).    Chief Complaint   Patient presents with    Nasal Polyps     Nasal polyps      This is a new patient, with H/O asthma, referred by Daniel Faye MD, for evaluation of nasal polyps.  Patient is accompanied by his wife, Anne.    Patient saw Dr. Faye for recurrent infections and polyps.  Last visit, patient's wife states Dr. Faye noted very large polyps but deferred surgical intervention due to risk of regrowth.  Dr. Faye did discuss biologic therapy with him.  He travels frequently as a salesman and relays he just completed antibiotic and prednisone for an infection.  He stays in hotels frequently.  His wife states he does not sleep because he is always congested and because of his polyps.  He has had about 4 rounds of prednisone the past year compared to less prior.  Patient has a mixed breed dogs that go in their bedroom.  He had a respiratory allergy panel in 2022 by Dr. Yarbrough that was negative.  He has not had skin prick testing.    Patient saw Jesus Yarbrough MD, Pulmonologist, in 2022 for management of his asthma.  He uses albuterol currently and was on Advair until he was switched to Wixela a couple of years ago due to cost.  He states he feels the Advair worked better and notes Wixela causes lung Sx.  A month ago, he was doing well until his sinuses flared.  He is active and has no trouble but his sinuses flared his asthma.  He is wondering if this is typical.    Patient has AFib and his wife states he goes in and out of fib.  He follows with Cardiology for management of his amiodarone.  He will be seeing a specialist next month for consideration of possible ablation.    Patient's wife was a phlebotomist and currently works in the Hematology lab at .    Review of Systems   HENT:  Positive for congestion.         Nasal polyps bilaterally. " "  Cardiovascular:  Positive for palpitations (Afib).   Psychiatric/Behavioral:  Positive for sleep disturbance (due to nasal congestion).      Objective   Ht 1.905 m (6' 3\")   Wt 132 kg (290 lb)   BMI 36.25 kg/m²      Physical Exam  Constitutional:       Appearance: Normal appearance.   HENT:      Head: Normocephalic and atraumatic.      Right Ear: External ear normal. There is no impacted cerumen.      Left Ear: External ear normal. There is no impacted cerumen.      Nose: No congestion or rhinorrhea.      Comments: Huge, visible nasal polyps that extend down past his middle turbinate bilaterallly.  Eyes:      Extraocular Movements: Extraocular movements intact.      Conjunctiva/sclera: Conjunctivae normal.      Pupils: Pupils are equal, round, and reactive to light.   Cardiovascular:      Rate and Rhythm: Normal rate and regular rhythm.      Heart sounds: No murmur heard.     No friction rub. No gallop.   Pulmonary:      Effort: No respiratory distress.      Breath sounds: Wheezing present. No rhonchi or rales.   Skin:     General: Skin is warm and dry.   Neurological:      Mental Status: He is alert.   Psychiatric:         Mood and Affect: Mood normal.         Behavior: Behavior normal.     Allergy testing was performed on Donavan Ordaz III \"Francisco\" using standard technique. There were no immediate complications.    Test Results  Panel 1  1.   Histamine: 5 x 5  2.   Saline - Diluent: 0  3.   Cockroach: 0  4.   Cotton Linters: 0  5.   Cat: 0  6.   Do  7.   D. Farinae: 0  8.   D. Pter: 0  9.   Feather: 0  10. Alternaria: 0  Tree Panel  1.   Adrian: 0  2.   Beech: 0  3.   Birch: 0  4.   Derby: 0  5.   Silver Maple: 0  6.   Hickory: 0  7.   Maple: 0  8.   Oak: 0  9.   Gwinn: 0  10. Paradise: 0  Grass/Misc Tree  1.   Bermuda: 0  2.   K[place] Blue: 0  3.   Edgardo: 0  4.   Lan: 0  5.   Orchard: 0  6.   Red Top: 0  7.   Rye Grass: 0  8.   Sweet Vernal: 0  9.   Black [place]: 0  10. Glenmora: 0  Weeds  1.   " Cocklebur: 0  2.   Common Mugwort: 0  3.   English Plantain: 0  4.   Hemp: 0  5.   Goldenrod: 0  6.   Kochia: 0  7.   Lamb's Quarter: 0  8.   Lamb Elder: 0  9.   Pigweed: 0  10. Ragweed: 0  Molds  1.   Aspergillus Niger: 0  2.   Aureobasid: 0  3.   Bipolaris: 0  4.   Cladosporidium: 0  5.   Epicoccum: 0  6.   Fusarium: 0  7.   Geotrichum: 0  8.   Helminthosporium: 0  9.   Penicillium: 0  10. Phoma: 0  Animal  1.   Cow: 0  2.   Gerbil: 0  3.   Goat: 0  4.   Gu[place] Pi  5.   Hamster: 0  6.   Horse: 0  7.   Mosquito: 0  8.   Mouse: 0  9.   Rabbit: 0  10. Rat: 0    Intradermal allergy testing was performed on [facility] using standard technique. There were no immediate complications.    Test Results  Controls  Intradermal Saline: 0  ID  Cat: 0  Cockroach: 0  Common Weed Mix: 0  Cotton: 0  Do  Feather: 0  KORT Mix: 0  Mite Mix: 1  Mold Mix #1: 0  Mold Mix #2: 1  Ragweed: 0  Tree Mix: 0    Skin testing is positive to dust and outdoor mold.    Pulmonary Functions Testing Results:    FEV1   Date Value Ref Range Status   2025 55 liters Final     FVC   Date Value Ref Range Status   2025 47 liters Final     FEV1/FVC   Date Value Ref Range Status   2025 117 % Final      Assessment/Plan     Nasal polyp  He saw Dr. Faye for recurrent infections and very large nasal polyps.  Surgical intervention was deferred due to risk of regrowth.  They did discuss biologic therapy.     He has huge visible nasal polyps that extend down past his middle turbinates bilaterally.  I discussed that Dupixent treats polyps along with other conditions including asthma.    Based on his labs I would like him to start Dupixent.  Dupixent is indicated in children down to 6 months of age.  Interestingly, it is indicated for eosinophilic asthma, atopic dermatitis, eosinophilic esophagitis, prurigo nodularis (which is a primary itch disorder ) and nasal polyps.  The most  common side effects when given for asthma are as  follows:    DUPIXENT 300 mg Q2W    versus          Placebo    Injection site reactions  144 (18%)                                                50 (6%)    Oropharyngeal pain (sore throat)   19 (2%)                                                      7 (1%)    Eosinophilia  16 (2%)                                                     2 (<1%)     I would like to see you back in 3.5 months. I will recheck a baseline and 6 week eosinophil level. In addition he is to use his Flonase 2 spray EN BID until he is seen again    Uncontrolled severe persistent allergic asthma  He saw Jesus Yarbrough MD, Pulmonologist, in 2022 for management of his asthma.  He uses albuterol currently and was on Advair until he was switched to Wixela a couple of years ago.  He states he feels the Advair worked better and notes Wixela causes lung Sx.  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    IO PFT does show low lung volumes.    He will start Symbicort 2 inhalations BID and continue albuterol as needed.    I ordered blood work for asthma phenotype.    Allergic rhinitis  His wife states he does not sleep due to chronic congestion and polyps.  He has had about 4 rounds of prednisone the past year compared to less prior.  He had a respiratory allergy panel in 2022 by Dr. Yarbrough that was negative.     Skin testing is positive to dust and mold. He is mildly atopic. He is not a good candidate for immunotherapy due to his mild reactivity and his low lung volumes.     Use Flonase 2 sprays each side one time a day.  Reviewed proper use.    By signing my name below, I, Giovanny Snyder, attest that this documentation has been prepared under the direction and in the presence of Veronica Foley MD.  All medical record entries made by the Scribe were at my direction and personally dictated by me. I have reviewed the chart and agree that the record accurately reflects my personal performance of the history, physical  exam, discussion and plan.

## 2025-03-12 ENCOUNTER — APPOINTMENT (OUTPATIENT)
Dept: CARDIOLOGY | Facility: HOSPITAL | Age: 50
End: 2025-03-12
Payer: COMMERCIAL

## 2025-03-13 ENCOUNTER — APPOINTMENT (OUTPATIENT)
Dept: ALLERGY | Facility: CLINIC | Age: 50
End: 2025-03-13
Payer: COMMERCIAL

## 2025-03-13 ENCOUNTER — SPECIALTY PHARMACY (OUTPATIENT)
Dept: PHARMACY | Facility: CLINIC | Age: 50
End: 2025-03-13

## 2025-03-13 VITALS — HEIGHT: 75 IN | BODY MASS INDEX: 36.06 KG/M2 | WEIGHT: 290 LBS

## 2025-03-13 DIAGNOSIS — J45.50 UNCONTROLLED SEVERE PERSISTENT ALLERGIC ASTHMA: ICD-10-CM

## 2025-03-13 DIAGNOSIS — J30.89 PERENNIAL ALLERGIC RHINITIS: ICD-10-CM

## 2025-03-13 DIAGNOSIS — J33.9 NASAL POLYP: ICD-10-CM

## 2025-03-13 DIAGNOSIS — J30.9 ALLERGIC RHINITIS, UNSPECIFIED SEASONALITY, UNSPECIFIED TRIGGER: Primary | ICD-10-CM

## 2025-03-13 DIAGNOSIS — J45.40 MODERATE PERSISTENT ASTHMA WITHOUT COMPLICATION (HHS-HCC): Chronic | ICD-10-CM

## 2025-03-13 PROBLEM — J45.909 ASTHMA: Chronic | Status: RESOLVED | Noted: 2023-08-08 | Resolved: 2025-03-13

## 2025-03-13 LAB
FEV1/FVC: 117 %
FEV1: 55 LITERS
FVC: 47 LITERS

## 2025-03-13 PROCEDURE — 95024 IQ TESTS W/ALLERGENIC XTRCS: CPT | Performed by: ALLERGY & IMMUNOLOGY

## 2025-03-13 PROCEDURE — 95004 PERQ TESTS W/ALRGNC XTRCS: CPT | Performed by: ALLERGY & IMMUNOLOGY

## 2025-03-13 PROCEDURE — 94060 EVALUATION OF WHEEZING: CPT | Performed by: ALLERGY & IMMUNOLOGY

## 2025-03-13 PROCEDURE — 99205 OFFICE O/P NEW HI 60 MIN: CPT | Performed by: ALLERGY & IMMUNOLOGY

## 2025-03-13 PROCEDURE — RXMED WILLOW AMBULATORY MEDICATION CHARGE

## 2025-03-13 RX ORDER — DUPILUMAB 300 MG/2ML
300 INJECTION, SOLUTION SUBCUTANEOUS
Qty: 4 ML | Refills: 11 | Status: SHIPPED | OUTPATIENT
Start: 2025-03-13 | End: 2025-03-13 | Stop reason: SDUPTHER

## 2025-03-13 RX ORDER — BUDESONIDE AND FORMOTEROL FUMARATE DIHYDRATE 160; 4.5 UG/1; UG/1
2 AEROSOL RESPIRATORY (INHALATION)
Qty: 30.6 G | Refills: 2 | Status: SHIPPED | OUTPATIENT
Start: 2025-03-13 | End: 2026-03-13

## 2025-03-13 RX ORDER — ALBUTEROL SULFATE 90 UG/1
2 INHALANT RESPIRATORY (INHALATION) EVERY 4 HOURS PRN
Qty: 18 G | Refills: 1 | Status: SHIPPED | OUTPATIENT
Start: 2025-03-13

## 2025-03-13 RX ORDER — FLUTICASONE PROPIONATE 50 MCG
2 SPRAY, SUSPENSION (ML) NASAL
Qty: 48 G | Refills: 1 | Status: SHIPPED | OUTPATIENT
Start: 2025-03-13

## 2025-03-13 ASSESSMENT — ENCOUNTER SYMPTOMS
PALPITATIONS: 1
SLEEP DISTURBANCE: 1

## 2025-03-13 NOTE — ASSESSMENT & PLAN NOTE
His wife states he does not sleep due to chronic congestion and polyps.  He has had about 4 rounds of prednisone the past year compared to less prior.  He had a respiratory allergy panel in 2022 by Dr. Yarbrough that was negative.     Skin testing is positive to dust and mold. He is mildly atopic. He is not a good candidate for immunotherapy due to his mild reactivity and his low lung volumes.     Use Flonase 2 sprays each side one time a day.  Reviewed proper use.

## 2025-03-13 NOTE — PATIENT INSTRUCTIONS
Obtain chest x-ray as ordered by Cardiology.    Skin testing is positive to dust and outdoor mold.    Use Flonase 2 sprays each side one time a day.  To increase the efficacy of your nasal spray, be sure to look down while using it.?  Spray slightly away from the direction of your nasal septum (the bone in the middle of your nose) and only sniff after you have sprayed - avoid spraying and sniffing at the same time, or else a lot of spray will go down your throat.     Get labs for asthma phenotype.  I will follow up with you with results and further recommendations.  We will recheck eosinophil count 4 to 6 weeks after starting Dupixent.    Start Symbicort 2 inhalations in the morning and 2 inhalations at night.  Be sure to rinse your mouth and brush teeth after every use.  You should see an improvement in approximately 4 days.     Continue albuterol as needed.    For nasal polyps, start Dupixent injection every other week.  There is a copay card available.  This will [facility] delivered [facility] home [facility] ([phone][facility]  We will contact you to confirm delivery once we obtain authorization.  If you do not hear from them in the next two weeks, please contact our office.     Follow up in 3 1/2 months or sooner if problems or symptoms worsen prior.

## 2025-03-13 NOTE — LETTER
"March 16, 2025     Gloria Barger DO  [address] [place] [id number]    Patient: Francisco Ordaz III   YOB: 1975   Date of Visit: 3/13/2025       Dear Dr. Gloria Barger DO:    Thank you for referring Francisco Ordaz to me for evaluation. Below are my notes for this consultation.  If you have questions, please do not hesitate to call me. I look forward to following your patient along with you.       Sincerely,     Veronica Foley MD      CC: Daniel Faye MD  ______________________________________________________________________________________      Subjective  Patient ID:   [id number]   Donavan Ordaz III \"Francisco\" is a 50 y.o. male who presents for Nasal Polyps (Nasal polyps).    Chief Complaint   Patient presents with   • Nasal Polyps     Nasal polyps      This is a new patient, with H/O asthma, referred by Daniel Faye MD, for evaluation of nasal polyps.  Patient is accompanied by his wife, Anne.    Patient saw Dr. Faye for recurrent infections and polyps.  Last visit, patient's wife states Dr. Faye noted very large polyps but deferred surgical intervention due to risk of regrowth.  Dr. Faye did discuss biologic therapy with him.  He travels frequently as a salesman and relays he just completed antibiotic and prednisone for an infection.  He stays in hotels frequently.  His wife states he does not sleep because he is always congested and because of his polyps.  He has had about 4 rounds of prednisone the past year compared to less prior.  Patient has a mixed breed dogs that go in their bedroom.  He had a respiratory allergy panel in 2022 by Dr. Yarbrough that was negative.  He has not had skin prick testing.    Patient saw Jesus Yarbrough MD, Pulmonologist, in 2022 for management of his asthma.  He uses albuterol currently and was on Advair until he was switched to Wixela a couple of years ago due to cost.  He states he feels the Advair " "worked better and notes Wixela causes lung Sx.  A month ago, he was doing well until his sinuses flared.  He is active and has no trouble but his sinuses flared his asthma.  He is wondering if this is typical.    Patient has AFib and his wife states he goes in and out of fib.  He follows with Cardiology for management of his amiodarone.  He will be seeing a specialist next month for consideration of possible ablation.    Patient's wife was a phlebotomist and currently works in the Hematology lab at .    Review of Systems   HENT:  Positive for congestion.         Nasal polyps bilaterally.   Cardiovascular:  Positive for palpitations (Afib).   Psychiatric/Behavioral:  Positive for sleep disturbance (due to nasal congestion).      Objective  Ht 1.905 m (6' 3\")   Wt 132 kg (290 lb)   BMI 36.25 kg/m²      Physical Exam  Constitutional:       Appearance: Normal appearance.   HENT:      Head: Normocephalic and atraumatic.      Right Ear: External ear normal. There is no impacted cerumen.      Left Ear: External ear normal. There is no impacted cerumen.      Nose: No congestion or rhinorrhea.      Comments: Huge, visible nasal polyps that extend down past his middle turbinate bilaterallly.  Eyes:      Extraocular Movements: Extraocular movements intact.      Conjunctiva/sclera: Conjunctivae normal.      Pupils: Pupils are equal, round, and reactive to light.   Cardiovascular:      Rate and Rhythm: Normal rate and regular rhythm.      Heart sounds: No murmur heard.     No friction rub. No gallop.   Pulmonary:      Effort: No respiratory distress.      Breath sounds: Wheezing present. No rhonchi or rales.   Skin:     General: Skin is warm and dry.   Neurological:      Mental Status: He is alert.   Psychiatric:         Mood and Affect: Mood normal.         Behavior: Behavior normal.     Allergy testing was performed on Donavan Ordaz III \"Francisco\" using standard technique. There were no immediate complications.    Test " Results  Panel 1  1.   Histamine: 5 x 5  2.   Saline - Diluent: 0  3.   Cockroach: 0  4.   Cotton Linters: 0  5.   Cat: 0  6.   Do  7.   D. Farinae: 0  8.   D. Pter: 0  9.   Feather: 0  10. Alternaria: 0  Tree Panel  1.   Adrian: 0  2.   Beech: 0  3.   Birch: 0  4.   [place]: 0  5.   Silver Maple: 0  6.   Hickory: 0  7.   Maple: 0  8.   Oak: 0  9.   [place]: 0  10. [place]: 0  Grass/Misc Tree  1.   Bermuda: 0  2.   [place] Blue: 0  3.   Edgardo: 0  4.   Lan: 0  5.   Orchard: 0  6.   Red Top: 0  7.   Rye Grass: 0  8.   Sweet Vernal: 0  9.   Black [place]: 0  10. [place]: 0  Weeds  1.   Cocklebur: 0  2.   Common Mugwort: 0  3.   English Plantain: 0  4.   Hemp: 0  5.   Goldenrod: 0  6.   Kochia: 0  7.   Lamb's Quarter: 0  8.   Lamb Elder: 0  9.   Pigweed: 0  10. Ragweed: 0  Molds  1.   Aspergillus Niger: 0  2.   Aureobasid: 0  3.   Bipolaris: 0  4.   Cladosporidium: 0  5.   Epicoccum: 0  6.   Fusarium: 0  7.   Geotrichum: 0  8.   Helminthosporium: 0  9.   Penicillium: 0  10. Phoma: 0  Animal  1.   Cow: 0  2.   Gerbil: 0  3.   Goat: 0  4.   [place] Pi  5.   Hamster: 0  6.   Horse: 0  7.   Mosquito: 0  8.   Mouse: 0  9.   Rabbit: 0  10. Rat: 0    Intradermal allergy testing was performed on Francisco Mendez Laflame III using standard technique. There were no immediate complications.    Test Results  Controls  Intradermal Saline: 0  ID  Cat: 0  Cockroach: 0  Common Weed Mix: 0  Cotton: 0  Do  Feather: 0  KORT Mix: 0  Mite Mix: 1  Mold Mix #1: 0  Mold Mix #2: 1  Ragweed: 0  Tree Mix: 0    Skin testing is positive to dust and outdoor mold.    Pulmonary Functions Testing Results:    FEV1   Date Value Ref Range Status   2025 55 liters Final     FVC   Date Value Ref Range Status   2025 47 liters Final     FEV1/FVC   Date Value Ref Range Status   2025 117 % Final      Assessment/Plan    Nasal polyp  He saw Dr. Faye for recurrent infections and very large nasal polyps.  Surgical intervention was  deferred due to risk of regrowth.  They did discuss biologic therapy.     He has huge visible nasal polyps that extend down past his middle turbinates bilaterally.  I discussed that Dupixent treats polyps along with other conditions including asthma.    Based on his labs I would like him to start Dupixent.  Dupixent is indicated in children down to 6 months of age.  Interestingly, it is indicated for eosinophilic asthma, atopic dermatitis, eosinophilic esophagitis, prurigo nodularis (which is a primary itch disorder ) and nasal polyps.  The most  common side effects when given for asthma are as follows:    DUPIXENT 300 mg Q2W    versus          Placebo    Injection site reactions  144 (18%)                                                50 (6%)    Oropharyngeal pain (sore throat)   19 (2%)                                                      7 (1%)    Eosinophilia  16 (2%)                                                     2 (<1%)     I would like to see you back in 3.5 months. I will recheck a baseline and 6 week eosinophil level. In addition he is to use his Flonase 2 spray EN BID until he is seen again    Uncontrolled severe persistent allergic asthma  He saw Jesus Yarbrough MD, Pulmonologist, in 2022 for management of his asthma.  He uses albuterol currently and was on Advair until he was switched to Wixela a couple of years ago.  He states he feels the Advair worked better and notes Wixela causes lung Sx.  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    IO PFT does show low lung volumes.    He will start Symbicort 2 inhalations BID and continue albuterol as needed.    I ordered blood work for asthma phenotype.    Allergic rhinitis  His wife states he does not sleep due to chronic congestion and polyps.  He has had about 4 rounds of prednisone the past year compared to less prior.  He had a respiratory allergy panel in 2022 by Dr. Yarbrough that was negative.     Skin testing is positive  to dust and mold. He is mildly atopic. He is not a good candidate for immunotherapy due to his mild reactivity and his low lung volumes.     Use Flonase 2 sprays each side one time a day.  Reviewed proper use.    By signing my name below, I, Bassam Snyderibmicheline, attest that this documentation has been prepared under the direction and in the presence of Veronica Foley MD.  All medical record entries made by the Scribe were at my direction and personally dictated by me. I have reviewed the chart and agree that the record accurately reflects my personal performance of the history, physical exam, discussion and plan.

## 2025-03-13 NOTE — ASSESSMENT & PLAN NOTE
He saw Dr. Faye for recurrent infections and very large nasal polyps.  Surgical intervention was deferred due to risk of regrowth.  They did discuss biologic therapy.     He has huge visible nasal polyps that extend down past his middle turbinates bilaterally.  I discussed that Dupixent treats polyps along with other conditions including asthma.    Based on his labs I would like him to start Dupixent.  Dupixent is indicated in children down to 6 months of age.  Interestingly, it is indicated for eosinophilic asthma, atopic dermatitis, eosinophilic esophagitis, prurigo nodularis (which is a primary itch disorder ) and nasal polyps.  The most  common side effects when given for asthma are as follows:    DUPIXENT 300 mg Q2W    versus          Placebo    Injection site reactions  144 (18%)                                                50 (6%)    Oropharyngeal pain (sore throat)   19 (2%)                                                      7 (1%)    Eosinophilia  16 (2%)                                                     2 (<1%)     I would like to see you back in 3.5 months. I will recheck a baseline and 6 week eosinophil level. In addition he is to use his Flonase 2 spray EN BID until he is seen again

## 2025-03-13 NOTE — ASSESSMENT & PLAN NOTE
He saw Jesus Yarbrough MD, Pulmonologist, in 2022 for management of his asthma.  He uses albuterol currently and was on Advair until he was switched to Wixela a couple of years ago.  He states he feels the Advair worked better and notes Wixela causes lung Sx.  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    IO PFT does show low lung volumes.    He will start Symbicort 2 inhalations BID and continue albuterol as needed.    I ordered blood work for asthma phenotype.

## 2025-03-14 LAB
BASOPHILS # BLD AUTO: 23 CELLS/UL (ref 0–200)
BASOPHILS NFR BLD AUTO: 0.3 %
EOSINOPHIL # BLD AUTO: 54 CELLS/UL (ref 15–500)
EOSINOPHIL NFR BLD AUTO: 0.7 %
ERYTHROCYTE [DISTWIDTH] IN BLOOD BY AUTOMATED COUNT: 11.9 % (ref 11–15)
HCT VFR BLD AUTO: 49.9 % (ref 38.5–50)
HGB BLD-MCNC: 16.5 G/DL (ref 13.2–17.1)
IGE SERPL-ACNC: 331 KU/L
LYMPHOCYTES # BLD AUTO: 878 CELLS/UL (ref 850–3900)
LYMPHOCYTES NFR BLD AUTO: 11.4 %
MCH RBC QN AUTO: 33.5 PG (ref 27–33)
MCHC RBC AUTO-ENTMCNC: 33.1 G/DL (ref 32–36)
MCV RBC AUTO: 101.2 FL (ref 80–100)
MONOCYTES # BLD AUTO: 270 CELLS/UL (ref 200–950)
MONOCYTES NFR BLD AUTO: 3.5 %
NEUTROPHILS # BLD AUTO: 6476 CELLS/UL (ref 1500–7800)
NEUTROPHILS NFR BLD AUTO: 84.1 %
PLATELET # BLD AUTO: 272 THOUSAND/UL (ref 140–400)
PMV BLD REES-ECKER: 10.7 FL (ref 7.5–12.5)
RBC # BLD AUTO: 4.93 MILLION/UL (ref 4.2–5.8)
WBC # BLD AUTO: 7.7 THOUSAND/UL (ref 3.8–10.8)

## 2025-03-15 DIAGNOSIS — R71.8 ELEVATED MCV: Primary | ICD-10-CM

## 2025-03-15 NOTE — PATIENT INSTRUCTIONS
Sinus sxs for at least 7 days and getting worse or 10 days with no improvement  Augmentin 875mg twice a day for 7 days  Moderate persistent asthma  Prednisone burst as written  Wixela not helping and he feels worse after taking it--discussed switching to another ICS/LABA--     INFORMATION ABOUT THE RISKS AND ADVERSE EFFECTS OF STEROIDS:  They can increase your blood pressure  They can cause trouble sleeping  They can make you luevano/grumpy  They can increase your appetite (and cause weight gain)  They can cause your lower legs to swell  They can irritate the lining of your stomach so never take on an empty stomach  They can increase your blood sugars (Diabetics must monitor their blood sugars and decrease carbohydrate/sugar intake while taking steroids)  They can cause decreased blood flow to the hip bone causing avascular necrosis (dead bone) that can cause significant disability and surgical intervention.    They can cause delayed wound healing.  They can also cause adrenal suppression (most concerning in people who take steroids daily or frequently)   They cause immunosuppression so you may be more susceptible to germs while taking them and for some time after    When we decide to use steroids we weight the risks and the benefits.      DO NOT take any OTC anti-inflammatories like motrin, aleve, ibuprofen while taking prednisone, use tylenol if you need something for pain.     Please send me a Good Thing message if you have any questions or concerns.  FOR NON URGENT questions only.  Allow up to 72 hours for response.    If you have prescription issues or other questions you can email   Lori Javed,  Digital Health Coordinator, at   [email or web address]     Rest and drink plenty of fluids    Tylenol and or motrin as needed for pain and fever (unless you have been told not to take these because of your personal medical history)    Discussed options and precautions (complaint specific and may  "include)  Viral versus bacterial infection; use of medications; possible side effects; appropriate over-the-counter medications; possible complications and /or when to follow-up.    if sent for in person care at  or ED,  it was explained why and failure to have \"higher level of care\" further evaluation, and treatment today may lead, but is not limited to negative outcomes, permanent disability, or even death.     All red flags requiring in person care were discussed.    If not sent for in person care today, follow-up with your PCP in 2-3 days, sooner if not  improving.    Follow-up immediately if symptoms worsen. If experiencing symptoms including but not limited to lethargy / chest pain / weakness / dizziness / difficulty breathing please call 911 or go to the emergency department for immediate care.     All patient's questions were answered. Patient has good decision making capacity.  They are alert to person, place, time and situation.  Patient has the ability to communicate choice, understand information, consequences, and reason rationally.      ____________________________________________________________________________________________________________  To connect with a new PCP please visit [email or web address] or call [phone]                          "

## 2025-03-16 ENCOUNTER — PHARMACY VISIT (OUTPATIENT)
Dept: PHARMACY | Facility: CLINIC | Age: 50
End: 2025-03-16
Payer: COMMERCIAL

## 2025-03-17 PROCEDURE — RXMED WILLOW AMBULATORY MEDICATION CHARGE

## 2025-03-18 LAB
BASOPHILS # BLD AUTO: 23 CELLS/UL (ref 0–200)
BASOPHILS NFR BLD AUTO: 0.3 %
EOSINOPHIL # BLD AUTO: 54 CELLS/UL (ref 15–500)
EOSINOPHIL NFR BLD AUTO: 0.7 %
ERYTHROCYTE [DISTWIDTH] IN BLOOD BY AUTOMATED COUNT: 11.9 % (ref 11–15)
FOLATE SERPL-MCNC: 4.2 NG/ML
HCT VFR BLD AUTO: 49.9 % (ref 38.5–50)
HGB BLD-MCNC: 16.5 G/DL (ref 13.2–17.1)
IGE SERPL-ACNC: 331 KU/L
LYMPHOCYTES # BLD AUTO: 878 CELLS/UL (ref 850–3900)
LYMPHOCYTES NFR BLD AUTO: 11.4 %
MCH RBC QN AUTO: 33.5 PG (ref 27–33)
MCHC RBC AUTO-ENTMCNC: 33.1 G/DL (ref 32–36)
MCV RBC AUTO: 101.2 FL (ref 80–100)
MONOCYTES # BLD AUTO: 270 CELLS/UL (ref 200–950)
MONOCYTES NFR BLD AUTO: 3.5 %
NEUTROPHILS # BLD AUTO: 6476 CELLS/UL (ref 1500–7800)
NEUTROPHILS NFR BLD AUTO: 84.1 %
PLATELET # BLD AUTO: 272 THOUSAND/UL (ref 140–400)
PMV BLD REES-ECKER: 10.7 FL (ref 7.5–12.5)
RBC # BLD AUTO: 4.93 MILLION/UL (ref 4.2–5.8)
VIT B12 SERPL-MCNC: 416 PG/ML (ref 200–1100)
WBC # BLD AUTO: 7.7 THOUSAND/UL (ref 3.8–10.8)

## 2025-03-21 LAB — A1AT PHENOTYP SERPL-IMP: NORMAL

## 2025-03-22 ENCOUNTER — PHARMACY VISIT (OUTPATIENT)
Dept: PHARMACY | Facility: CLINIC | Age: 50
End: 2025-03-22
Payer: COMMERCIAL

## 2025-03-25 PROCEDURE — RXMED WILLOW AMBULATORY MEDICATION CHARGE

## 2025-03-28 ENCOUNTER — SPECIALTY PHARMACY (OUTPATIENT)
Dept: PHARMACY | Facility: CLINIC | Age: 50
End: 2025-03-28

## 2025-03-31 ENCOUNTER — PHARMACY VISIT (OUTPATIENT)
Dept: PHARMACY | Facility: CLINIC | Age: 50
End: 2025-03-31
Payer: COMMERCIAL

## 2025-03-31 LAB — NONINV COLON CA DNA+OCC BLD SCRN STL QL: NEGATIVE

## 2025-04-01 PROCEDURE — RXMED WILLOW AMBULATORY MEDICATION CHARGE

## 2025-04-03 ENCOUNTER — SPECIALTY PHARMACY (OUTPATIENT)
Dept: PHARMACY | Facility: CLINIC | Age: 50
End: 2025-04-03

## 2025-04-03 ENCOUNTER — PHARMACY VISIT (OUTPATIENT)
Dept: PHARMACY | Facility: CLINIC | Age: 50
End: 2025-04-03
Payer: COMMERCIAL

## 2025-04-19 PROCEDURE — RXMED WILLOW AMBULATORY MEDICATION CHARGE

## 2025-04-22 ENCOUNTER — PHARMACY VISIT (OUTPATIENT)
Dept: PHARMACY | Facility: CLINIC | Age: 50
End: 2025-04-22
Payer: COMMERCIAL

## 2025-04-23 ENCOUNTER — SPECIALTY PHARMACY (OUTPATIENT)
Dept: PHARMACY | Facility: CLINIC | Age: 50
End: 2025-04-23

## 2025-04-23 PROCEDURE — RXMED WILLOW AMBULATORY MEDICATION CHARGE

## 2025-05-02 ENCOUNTER — SPECIALTY PHARMACY (OUTPATIENT)
Dept: PHARMACY | Facility: CLINIC | Age: 50
End: 2025-05-02

## 2025-05-05 ENCOUNTER — PHARMACY VISIT (OUTPATIENT)
Dept: PHARMACY | Facility: CLINIC | Age: 50
End: 2025-05-05
Payer: COMMERCIAL

## 2025-05-20 DIAGNOSIS — I10 BENIGN ESSENTIAL HYPERTENSION: Chronic | ICD-10-CM

## 2025-05-20 PROCEDURE — RXMED WILLOW AMBULATORY MEDICATION CHARGE

## 2025-05-20 RX ORDER — LISINOPRIL 20 MG/1
20 TABLET ORAL 2 TIMES DAILY
Qty: 180 TABLET | Refills: 1 | Status: CANCELLED | OUTPATIENT
Start: 2025-05-20 | End: 2026-05-20

## 2025-05-22 ENCOUNTER — PHARMACY VISIT (OUTPATIENT)
Dept: PHARMACY | Facility: CLINIC | Age: 50
End: 2025-05-22
Payer: COMMERCIAL

## 2025-05-22 DIAGNOSIS — I10 BENIGN ESSENTIAL HYPERTENSION: Chronic | ICD-10-CM

## 2025-05-22 RX ORDER — LISINOPRIL 20 MG/1
20 TABLET ORAL 2 TIMES DAILY
Qty: 180 TABLET | Refills: 1 | Status: CANCELLED | OUTPATIENT
Start: 2025-05-20 | End: 2026-05-20

## 2025-05-23 ENCOUNTER — SPECIALTY PHARMACY (OUTPATIENT)
Dept: PHARMACY | Facility: CLINIC | Age: 50
End: 2025-05-23

## 2025-05-27 ENCOUNTER — TELEPHONE (OUTPATIENT)
Dept: CARDIOLOGY | Facility: HOSPITAL | Age: 50
End: 2025-05-27
Payer: COMMERCIAL

## 2025-05-27 DIAGNOSIS — I10 BENIGN ESSENTIAL HYPERTENSION: Chronic | ICD-10-CM

## 2025-05-27 PROCEDURE — RXMED WILLOW AMBULATORY MEDICATION CHARGE

## 2025-05-27 RX ORDER — LISINOPRIL 20 MG/1
20 TABLET ORAL 2 TIMES DAILY
Qty: 180 TABLET | Refills: 1 | Status: SHIPPED | OUTPATIENT
Start: 2025-05-27 | End: 2025-11-23

## 2025-05-27 NOTE — TELEPHONE ENCOUNTER
PT called and stated that he Is out of lisinopril and has been waiting for a refill, informed pt that I will send a message back to the staff.     Routing message to care team

## 2025-05-28 ENCOUNTER — PHARMACY VISIT (OUTPATIENT)
Dept: PHARMACY | Facility: CLINIC | Age: 50
End: 2025-05-28
Payer: COMMERCIAL

## 2025-05-30 PROCEDURE — RXMED WILLOW AMBULATORY MEDICATION CHARGE

## 2025-06-01 PROBLEM — I50.22 HEART FAILURE WITH MID-RANGE EJECTION FRACTION (HFMEF): Status: ACTIVE | Noted: 2025-06-01

## 2025-06-01 NOTE — PROGRESS NOTES
[facility] Heart and Vascular Cardiology    Patient Name: Donavan Ordaz III  Patient : 1975    Scribe Attestation  By signing my name below, Tonia BARROS, Giovanny attest that this documentation has been prepared under the direction and in the presence of Tony Mckeon DO.    Physician Attestation  Tony BARROS DO, personally performed the services described in the documentation as scribed by Tonia Sotelo in my presence, and confirm it is both accurate and complete.    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  The patient was last evaluated by me in 2024.  At that visit I referred the patient to EP cardiology, referred the patient to endocrinology, ordered blood work including CMP/lipid/magnesium/CBC/TSH be drawn in 6 months, ordered a chest x-ray, ordered an echocardiogram, and asked the patient to follow-up in 6 months and sooner if necessary.  CMP done 2024 showed normal serum sodium and potassium with a serum creatinine of 1.19, normal ALT/AST, TSH was 3.12.  CBC done 3/13/2025 showed a hemoglobin of 16.5. ECG done today showed ***            Assessment/Plan:   1. Paroxysmal atrial fibrillation  The patient has a history of paroxysmal atrial fibrillation on apixaban for thromboembolism prophylaxis, which should be continued.  He should continue carvedilol for heart  rate control.  He is currently on amiodarone to reduce his burden of atrial fibrillation.   ECG done today showed ***  He denies chest pain, palpitations or lightheadedness.   Echocardiogram done in September 2023 showed mildly reduced left ventricular systolic function with an ejection fraction of 45 to 50%, dilated right ventricle with normal right ventricular systolic function, biatrial dilatation, mild dilatation of the ascending aorta measured at 4.0 cm. ***  Recent lab works as noted in the HPI.   Lab works as noted below will be done in 6 months prior to his next visit.   I will refer him to EP cardiology for further evaluation and management.   Follow up in 6 months and sooner if necessary.      2. Anticoagulation with apixaban  The patient is on anticoagulation with apixaban for paroxysmal atrial fibrillation.  Recent lab works as noted in the HPI.  Lab works as noted below will be done in 6 months prior to his next visit.      3. Antiarrhythmic drug therapy with amiodarone   The patient is on antiarrhythmic drug therapy with amiodarone for paroxysmal atrial fibrillation.  Recent lab works as noted in the HPI.  Lab works will be done today and again in 6 months prior to the next visit.   Chest x-ray was ordered as noted below.   I will also refer him to Endocrinology due to fluctuating thyroid function.   I will also refer him to EP cardiology for further evaluation and management.     4. HFmrEF  The patient has HFmrEF with an ejection fraction zully of 20% now improved to 45%  Echocardiogram done in September 2023 showed mildly reduced left ventricular systolic function with an ejection fraction of 45 to 50%, dilated right ventricle with normal right ventricular systolic function, biatrial dilatation, mild dilatation of the ascending aorta measured at 4.0 cm. ***  He does not appear significantly volume overloaded on exam today.  He should continue his current cardiac medications.  Recent lab works as  noted in the HPI.  Lab works as noted below will be done in 6 months prior to his next visit.   I discussed with him the importance of following a low-sodium heart healthy diet as well as weight loss.   Follow up in 6 months and sooner if necessary.      5. Hypertension  The patient has a history of hypertension which appears controlled on exam today.  He should continue his current antihypertensive medications and monitor his blood pressure at home.     6. Ascending aortic aneurysm  The patient has a history of ascending aortic aneurysm.   Echocardiogram done in September 2023 showed mildly reduced left ventricular systolic function with an ejection fraction of 45 to 50%, dilated right ventricle with normal right ventricular systolic function, biatrial dilatation, mild dilatation of the ascending aorta measured at 4.0 cm. ***  Continue risk factor modification.     7. Obstructive sleep apnea  Management as per PCP.     8. Obesity  Please see lifestyle recommendations below.            Orders:   CMP/lipid/magnesium/CBC/TSH results?, ***   Chest x-ray results?, ***   Echocardiogram results?, ***   Referral to EP cardiology,   CMP/magnesium/CBC/TSH in 6 months,   Follow-up in 6 months.    Lifestyle Recommendations  I recommend a whole-food plant-based diet, an eating pattern that encourages the consumption of unrefined plant foods (such as fruits, vegetables, tubers, whole grains, legumes, nuts and seeds) and discourages meats, dairy products, eggs and processed foods.     The AHA/ACC recommends that the patient consume a dietary pattern that emphasizes intake of vegetables, fruits, and whole grains; includes low-fat dairy products, poultry, fish, legumes, non-tropical vegetable oils, and nuts; and limits intake of sodium, sweets, sugar-sweetened beverages, and red meats.  Adapt this dietary pattern to appropriate calorie requirements (a 500-750 kcal/day deficit to loose weight), personal and cultural food  preferences, and nutrition therapy for other medical conditions (including diabetes).  Achieve this pattern by following plans such as the [place] Mediterranean, DASH dietary pattern, or AHA diet.     Engage in 2 hours and 30 minutes per week of moderate-intensity physical activity, or 1 hour and 15 minutes (75 minutes) per week of vigorous-intensity aerobic physical activity, or an equivalent combination of moderate and vigorous-intensity aerobic physical activity. Aerobic activity should be performed in episodes of at least 10 minutes preferably spread throughout the week.     Adhering to a heart healthy diet, regular exercise habits, avoidance of tobacco products, and maintenance of a healthy weight are crucial components of their heart disease risk reduction.     Any positive review of systems not specifically addressed in the office visit today should be evaluated and treated by the patients primary care physician or in an emergency department if necessary     Patient was notified that results from ordered tests will be called to the patient if it changes current management; it will otherwise be discussed at a future appointment and available on  [email or web address].     Thank you for allowing me to participate in the care of this patient.        This document was generated using the assistance of voice recognition software. If there are any errors of spelling, grammar, syntax, or meaning; please feel free to contact me directly for clarification.    Past Medical History:  He has a past medical history of Personal history of other diseases of the respiratory system (01/03/2018), Personal history of other diseases of the respiratory system (10/30/2019), and Unspecified foreign body in bronchus causing asphyxiation, initial encounter (10/01/2020).    Past Surgical History:  He has a past surgical history that includes Other surgical history (05/21/2020) and Other surgical history (05/20/2020).      Social History:  He  "reports that he has never smoked. He has never used smokeless tobacco. He reports current alcohol use. He reports that he does not use drugs.    Family History:  Family History  Problem Relation Name Age of Onset    Hypertension Mother          Allergies:  Patient has no known allergies.    Outpatient Medications:  Current Outpatient Medications   Medication Instructions    albuterol (Ventolin HFA) 90 mcg/actuation inhaler 2 puffs, inhalation, Every 4 hours PRN    albuterol 2.5 mg, Every 4 hours PRN    amiodarone (Pacerone) 200 mg tablet TAKE 1 TABLET BY MOUTH DAILY    budesonide-formoteroL (Symbicort) 160-4.5 mcg/actuation inhaler 2 puffs, inhalation, 2 times daily RT, Rinse mouth with water after use to reduce aftertaste and incidence of candidiasis. Do not swallow.    carvedilol (Coreg) 25 mg tablet TAKE 1 TABLET (25 MG) BY MOUTH TWICE A DAY WITH MEALS    dupilumab (Dupixent) 300 mg/2 mL prefilled syringe Inject 300 mg (1 syringe) under the skin every 14 days    Eliquis 5 mg, oral, 2 times daily    fluticasone (Flonase) 50 mcg/actuation nasal spray 2 sprays, Each Nostril, Daily before breakfast    lisinopril 20 mg, oral, 2 times daily    spironolactone (ALDACTONE) 25 mg, oral, Daily        ROS:  A 14 point review of systems was done and is negative other than as stated in HPI    Vitals:      8/22/2023     4:05 PM 11/20/2024     9:59 AM 12/3/2024     3:54 PM 1/20/2025    11:10 AM 2/7/2025    11:58 AM 2/7/2025     1:31 PM 3/13/2025     9:56 AM   Vitals   Systolic 120  128 126 140 125    Diastolic 80  74 84 92 80    BP Location   Left arm       Heart Rate 114  82 60 66     Temp    36.1 °C (97 °F) 36.4 °C (97.5 °F)     Height 1.905 m (6' 3\") 1.905 m (6' 3\") 1.905 m (6' 3\")    1.905 m (6' 3\")   Weight (lb) 283 301.2 301.2  314.6  290   BMI 35.37 kg/m2 37.65 kg/m2 37.65 kg/m2  39.32 kg/m2  36.25 kg/m2   BSA (m2) 2.6 m2 2.69 m2 2.69 m2  2.75 m2  2.64 m2        Physical Exam:   ***  Constitutional: Cooperative, in no " acute distress, alert, appears stated age.  Skin: Skin color, texture, turgor normal. No rashes or lesions.  Head: Normocephalic. No masses, lesions, tenderness or abnormalities  Eyes: Extraocular movements are grossly intact.  Mouth and throat: Mucous membranes moist  Neck: Neck supple, no carotid bruits, no JVD  Respiratory: Lungs clear to auscultation, no wheezing or rhonchi, no use of accessory muscles  Chest wall: No scars, normal excursion with respiration  Cardiovascular: Regular rhythm without murmur  Gastrointestinal: Abdomen soft, nontender. Bowel sounds normal. Obese  Musculoskeletal: Strength equal in upper extremities  Extremities: No pitting edema  Neurologic: Sensation grossly intact, alert and oriented x3      Intake/Output:   No intake/output data recorded.    Outpatient Medications  Current Outpatient Medications on File Prior to Visit   Medication Sig Dispense Refill    albuterol (Ventolin HFA) 90 mcg/actuation inhaler Inhale 2 puffs every 4 hours if needed for wheezing or shortness of breath. 18 g 1    albuterol 2.5 mg /3 mL (0.083 %) nebulizer solution Take 3 mL (2.5 mg) by nebulization every 4 hours if needed.      amiodarone (Pacerone) 200 mg tablet TAKE 1 TABLET BY MOUTH DAILY 180 tablet 1    apixaban (Eliquis) 5 mg tablet Take 1 tablet (5 mg) by mouth 2 times a day. 180 tablet 1    budesonide-formoteroL (Symbicort) 160-4.5 mcg/actuation inhaler Inhale 2 puffs 2 times a day. Rinse mouth with water after use to reduce aftertaste and incidence of candidiasis. Do not swallow. 30.6 g 2    carvedilol (Coreg) 25 mg tablet TAKE 1 TABLET (25 MG) BY MOUTH TWICE A DAY WITH MEALS 180 tablet 3    dupilumab (Dupixent) 300 mg/2 mL prefilled syringe Inject 300 mg (1 syringe) under the skin every 14 days 4 mL 11    fluticasone (Flonase) 50 mcg/actuation nasal spray Administer 2 sprays into each nostril once daily in the morning. Take before meals. 48 g 1    lisinopril 20 mg tablet Take 1 tablet (20 mg) by  mouth 2 times a day. 180 tablet 1    spironolactone (Aldactone) 25 mg tablet Take 1 tablet (25 mg) by mouth once daily. 90 tablet 1    [DISCONTINUED] lisinopril 20 mg tablet TAKE 1 TABLET BY MOUTH TWO TIMES A  tablet 1     No current facility-administered medications on file prior to visit.       Labs: (past 26 weeks)  Recent Results (from the past 26 weeks)   ALT    Collection Time: 12/02/24  9:54 AM   Result Value Ref Range    ALT 11 10 - 52 U/L   AST    Collection Time: 12/02/24  9:54 AM   Result Value Ref Range    AST 20 9 - 39 U/L   Basic metabolic panel    Collection Time: 12/02/24  9:54 AM   Result Value Ref Range    Glucose 115 (H) 74 - 99 mg/dL    Sodium 140 136 - 145 mmol/L    Potassium 4.6 3.5 - 5.3 mmol/L    Chloride 103 98 - 107 mmol/L    Bicarbonate 26 21 - 32 mmol/L    Anion Gap 16 10 - 20 mmol/L    Urea Nitrogen 19 6 - 23 mg/dL    Creatinine 1.19 0.50 - 1.30 mg/dL    eGFR 74 >60 mL/min/1.73m*2    Calcium 9.5 8.6 - 10.3 mg/dL   TSH    Collection Time: 12/02/24  9:54 AM   Result Value Ref Range    Thyroid Stimulating Hormone 3.12 0.44 - 3.98 mIU/L   CBC    Collection Time: 12/02/24  9:54 AM   Result Value Ref Range    WBC 11.0 4.4 - 11.3 x10*3/uL    nRBC 0.0 0.0 - 0.0 /100 WBCs    RBC 5.18 4.50 - 5.90 x10*6/uL    Hemoglobin 16.6 13.5 - 17.5 g/dL    Hematocrit 51.1 41.0 - 52.0 %    MCV 99 80 - 100 fL    MCH 32.0 26.0 - 34.0 pg    MCHC 32.5 32.0 - 36.0 g/dL    RDW 13.3 11.5 - 14.5 %    Platelets 290 150 - 450 x10*3/uL   Tissue/Wound Culture/Smear    Collection Time: 02/07/25  1:32 PM    Specimen: Wound/Tissue; Tissue/Biopsy   Result Value Ref Range    Tissue/Wound Culture/Smear (1+) Rare Mixed Anaerobic Bacteria     Beta Lactamase (Cefinase) Negative     Gram Stain (4+) Abundant Polymorphonuclear leukocytes (A)     Gram Stain (1+) Rare Gram positive cocci (A)    Alpha-1 Antitrypsin Phenotype    Collection Time: 03/13/25 11:17 AM   Result Value Ref Range    ALPHA 1 ANTITRYPSIN (AAT) PHENOTYPE SEE  NOTE    CBC and Auto Differential    Collection Time: 03/13/25 11:20 AM   Result Value Ref Range    WHITE BLOOD CELL COUNT 7.7 3.8 - 10.8 Thousand/uL    RED BLOOD CELL COUNT 4.93 4.20 - 5.80 Million/uL    HEMOGLOBIN 16.5 13.2 - 17.1 g/dL    HEMATOCRIT 49.9 38.5 - 50.0 %    .2 (H) 80.0 - 100.0 fL    MCH 33.5 (H) 27.0 - 33.0 pg    MCHC 33.1 32.0 - 36.0 g/dL    RDW 11.9 11.0 - 15.0 %    PLATELET COUNT 272 140 - 400 Thousand/uL    MPV 10.7 7.5 - 12.5 fL    ABSOLUTE NEUTROPHILS 6,476 1,500 - 7,800 cells/uL    ABSOLUTE LYMPHOCYTES 878 850 - 3,900 cells/uL    ABSOLUTE MONOCYTES 270 200 - 950 cells/uL    ABSOLUTE EOSINOPHILS 54 15 - 500 cells/uL    ABSOLUTE BASOPHILS 23 0 - 200 cells/uL    NEUTROPHILS 84.1 %    LYMPHOCYTES 11.4 %    MONOCYTES 3.5 %    EOSINOPHILS 0.7 %    BASOPHILS 0.3 %   Immunoglobulin IgE    Collection Time: 03/13/25 11:20 AM   Result Value Ref Range    IMMUNOGLOBULIN E 331 (H) <GS=496 kU/L   [facility] VITAMIN B12/FOLATE, SERUM PANEL (REFL)    Collection Time: 03/13/25 11:20 AM   Result Value Ref Range    VITAMIN B12 416 200 - 1,100 pg/mL    FOLATE, SERUM 4.2 (L) ng/mL   Pulmonary function testing    Collection Time: 03/13/25 11:38 AM   Result Value Ref Range    FEV1 55 liters    FVC 47 liters    FEV1/ %   Cologuard® colon cancer screening    Collection Time: 03/26/25  4:30 PM   Result Value Ref Range    NONINV COLON CA DNA+OCC BLD SCRN STL QL Negative Negative       ECG  Encounter Date: 12/03/24   ECG 12 Lead    Narrative    Sinus rhythm, ST and T wave abnormality, heart rate 82 bpm.       Echocardiogram  No results found for this or any previous visit from the past 1095 days.      CV Studies:  EKG:  Encounter Date: 12/03/24   ECG 12 Lead    Narrative    Sinus rhythm, ST and T wave abnormality, heart rate 82 bpm.     Echocardiogram:   [place]ocardi[place] [address]  Phone [phone] Fax [phone]    TRANSTHORACIC  ECHOCARDIOGRAM REPORT      Patient Name:     BRAYAN HICKEY   Reading Physician:  [id number] Sameer Malin MD  III  Study Date:       9/19/2023            Referring           KRISTI BAKER  Physician:  MRN/PID:          [id number]             PCP:  Accession/Order#: [id number]         [facility] Echo  Location:           Lab  YOB: 1975            Fellow:  Gender:           M                    Nurse:  Admit Date:                            Sonographer:        Sofia [facility]  Admission Status: Outpatient           Additional Staff:  Height:           187.00 cm            CC Report to:  Weight:           131.00 kg            Study Type:         Echocardiogram  BSA:              2.53 m2  Blood Pressure: 132 /91 mmHg    Diagnosis/ICD: I42.9-Cardiomyopathy, unspecified; I48.0-Paroxysmal atrial  fibrillation  Indication:    Cardiomyopathy, Afib  Procedure/CPT: Echo Complete w Full [id number]    Patient History:  Pertinent History: A-Fib.    Study Detail: The following Echo studies were performed: 2D, M-Mode, Doppler and  color flow. Technically challenging study due to body habitus and  prominent lung artifact.      PHYSICIAN INTERPRETATION:  Left Ventricle: Left ventricular systolic function is mildly decreased, with an estimated ejection fraction of 45-50%. There is global hypokinesis of the left ventricle with minor regional variations. The left ventricular cavity size is normal. There is mild to moderate left ventricular hypertrophy. Left ventricular diastolic filling was indeterminate.  Left Atrium: The left atrium is mild to moderately dilated.  Right Ventricle: The right ventricle is slightly enlarged. There is normal right ventricular global systolic function.  Right Atrium: The right atrium is mildly dilated.  Aortic Valve: The aortic valve appears structurally normal. There is no evidence of aortic valve regurgitation.  Mitral Valve: The mitral valve is normal in  structure. There is trace mitral valve regurgitation.  Tricuspid Valve: The tricuspid valve is structurally normal. No evidence of tricuspid regurgitation.  Pulmonic Valve: The pulmonic valve is structurally normal. There is no indication of pulmonic valve regurgitation.  Pericardium: There is no pericardial effusion noted.  Aorta: The aortic root is normal. There is mild dilatation of the ascending aorta.      CONCLUSIONS:  1. Left ventricular systolic function is mildly decreased with a 45-50% estimated ejection fraction.  2. The left atrium is mild to moderately dilated.  3. There is global hypokinesis of the left ventricle with minor regional variations.    QUANTITATIVE DATA SUMMARY:  2D MEASUREMENTS:  Normal Ranges:  Ao Root d:     3.50 cm   (2.0-3.7cm)  LAs:           3.98 cm   (2.7-4.0cm)  IVSd:          1.45 cm   (0.6-1.1cm)  LVPWd:         1.43 cm   (0.6-1.1cm)  LVIDd:         3.82 cm   (3.9-5.9cm)  LVIDs:         2.93 cm  LV Mass Index: 80.6 g/m2  LV % FS        23.2 %    LA VOLUME:  Normal Ranges:  LA Vol A4C:        74.8 ml    (22+/-6mL/m2)  LA Vol A2C:        93.9 ml  LA Vol BP:         87.5 ml  LA Vol Index A4C:  29.6ml/m2  LA Vol Index A2C:  37.1 ml/m2  LA Vol Index BP:   34.6 ml/m2  LA Area A4C:       24.1 cm2  LA Area A2C:       28.2 cm2  LA Major Axis A4C: 6.6 cm  LA Major Axis A2C: 7.2 cm  LA Vol A4C:        72.0 ml  LA Vol A2C:        92.5 ml    RA VOLUME BY A/L METHOD:  Normal Ranges:  RA Area A4C: 21.6 cm2    AORTA MEASUREMENTS:  Normal Ranges:  Ao STJ, d: 3.10 cm (1.7-3.4cm)  Asc Ao, d: 4.00 cm (2.1-3.4cm)    LV SYSTOLIC FUNCTION BY 2D PLANIMETRY (MOD):  Normal Ranges:  EF-A4C View: 49.3 % (>=55%)  EF-A2C View: 48.1 %  EF-Biplane:  48.0 %    LV DIASTOLIC FUNCTION:  Normal Ranges:  MV Peak E:    0.66 m/s (0.7-1.2 m/s)  MV Peak A:    0.01 m/s (0.42-0.7 m/s)  E/A Ratio:    73.44    (1.0-2.2)  MV e'         0.16 m/s (>8.0)  MV lateral e' 0.18 m/s  MV medial e'  0.13 m/s  E/e' Ratio:   4.27      (<8.0)    MITRAL VALVE:  Normal Ranges:  MV DT: 154 msec (150-240msec)    AORTIC VALVE:  Normal Ranges:  LVOT Max Jorge:  0.94 m/s (<=1.1m/s)  LVOT VTI:      15.24 cm  LVOT Diameter: 2.41 cm  (1.8-2.4cm)      RIGHT VENTRICLE:  RV Basal 4.25 cm  RV Mid   3.70 cm  RV Major 7.3 cm  TAPSE:   17.4 mm  RV s'    0.16 m/s    TRICUSPID VALVE/RVSP:  Normal Ranges:  IVC Diam: 1.83 cm  TV e'     0.1 m/s    AORTA:  Asc Ao Diam 4.03 cm      [id number] Sameer Malin MD  Electronically signed on 9/19/2023 at 2:02:54 PM         Final     Stress Testing IMGRESULT([id number]:1:1825): No results found for this or any previous visit from the past 1825 days.    Cardiac Catheterization: No results found for this or any previous visit from the past 1825 days.  No results found for this or any previous visit from the past 3650 days.     Cardiac Scoring: No results found for this or any previous visit from the past 1825 days.    AAA : No results found for this or any previous visit from the past 1825 days.    OTHER: No results found for this or any previous visit from the past 1825 days.    LAST IMAGING RESULTS  ECG 12 Lead  Sinus rhythm, ST and T wave abnormality, heart rate 82 bpm.      Problem List Items Addressed This Visit       Benign essential hypertension (Chronic)    Obstructive sleep apnea syndrome    Paroxysmal atrial fibrillation (Multi) - Primary (Chronic)    On apixaban therapy    Long term current use of amiodarone    Obesity (BMI 30-39.9)    Ascending aorta dilatation    Heart failure with mid-range ejection fraction (HFmEF)          Tony Mckeon DO, FACC, FACOI

## 2025-06-05 ENCOUNTER — PHARMACY VISIT (OUTPATIENT)
Dept: PHARMACY | Facility: CLINIC | Age: 50
End: 2025-06-05
Payer: COMMERCIAL

## 2025-06-06 ENCOUNTER — APPOINTMENT (OUTPATIENT)
Dept: CARDIOLOGY | Facility: HOSPITAL | Age: 50
End: 2025-06-06
Payer: COMMERCIAL

## 2025-06-06 DIAGNOSIS — I50.22 HEART FAILURE WITH MID-RANGE EJECTION FRACTION (HFMEF): ICD-10-CM

## 2025-06-06 DIAGNOSIS — G47.33 OBSTRUCTIVE SLEEP APNEA SYNDROME: ICD-10-CM

## 2025-06-06 DIAGNOSIS — I77.810 ASCENDING AORTA DILATATION: ICD-10-CM

## 2025-06-06 DIAGNOSIS — I48.0 PAROXYSMAL ATRIAL FIBRILLATION (MULTI): Primary | Chronic | ICD-10-CM

## 2025-06-06 DIAGNOSIS — Z79.899 LONG TERM CURRENT USE OF AMIODARONE: ICD-10-CM

## 2025-06-06 DIAGNOSIS — I10 BENIGN ESSENTIAL HYPERTENSION: Chronic | ICD-10-CM

## 2025-06-06 DIAGNOSIS — Z79.01 ON APIXABAN THERAPY: ICD-10-CM

## 2025-06-06 DIAGNOSIS — E66.9 OBESITY (BMI 30-39.9): ICD-10-CM

## 2025-06-17 DIAGNOSIS — I42.9 CARDIOMYOPATHY, UNSPECIFIED TYPE (MULTI): Chronic | ICD-10-CM

## 2025-06-17 PROCEDURE — RXMED WILLOW AMBULATORY MEDICATION CHARGE

## 2025-06-18 PROCEDURE — RXMED WILLOW AMBULATORY MEDICATION CHARGE

## 2025-06-19 ENCOUNTER — PHARMACY VISIT (OUTPATIENT)
Dept: PHARMACY | Facility: CLINIC | Age: 50
End: 2025-06-19
Payer: COMMERCIAL

## 2025-06-19 PROCEDURE — RXMED WILLOW AMBULATORY MEDICATION CHARGE

## 2025-06-19 RX ORDER — SPIRONOLACTONE 25 MG/1
25 TABLET ORAL DAILY
Qty: 90 TABLET | Refills: 3 | Status: SHIPPED | OUTPATIENT
Start: 2025-06-19 | End: 2026-06-19

## 2025-06-23 PROCEDURE — RXMED WILLOW AMBULATORY MEDICATION CHARGE

## 2025-07-01 ENCOUNTER — SPECIALTY PHARMACY (OUTPATIENT)
Dept: PHARMACY | Facility: CLINIC | Age: 50
End: 2025-07-01

## 2025-07-05 ENCOUNTER — PHARMACY VISIT (OUTPATIENT)
Dept: PHARMACY | Facility: CLINIC | Age: 50
End: 2025-07-05
Payer: COMMERCIAL

## 2025-07-06 DIAGNOSIS — J45.40 MODERATE PERSISTENT ASTHMA WITHOUT COMPLICATION (HHS-HCC): Chronic | ICD-10-CM

## 2025-07-06 PROCEDURE — RXMED WILLOW AMBULATORY MEDICATION CHARGE

## 2025-07-06 RX ORDER — ALBUTEROL SULFATE 90 UG/1
2 INHALANT RESPIRATORY (INHALATION) EVERY 4 HOURS PRN
Qty: 18 G | Refills: 1 | Status: CANCELLED | OUTPATIENT
Start: 2025-07-06

## 2025-07-07 ENCOUNTER — PHARMACY VISIT (OUTPATIENT)
Dept: PHARMACY | Facility: CLINIC | Age: 50
End: 2025-07-07
Payer: COMMERCIAL

## 2025-07-07 DIAGNOSIS — J45.40 MODERATE PERSISTENT ASTHMA WITHOUT COMPLICATION (HHS-HCC): Chronic | ICD-10-CM

## 2025-07-07 RX ORDER — ALBUTEROL SULFATE 90 UG/1
2 INHALANT RESPIRATORY (INHALATION) EVERY 4 HOURS PRN
Qty: 18 G | Refills: 1 | Status: CANCELLED | OUTPATIENT
Start: 2025-07-06

## 2025-07-08 NOTE — PROGRESS NOTES
"  Subjective   Patient ID:   [id number]   Donavan Ordaz III \"Francisco\" is a 50 y.o. male who presents for Asthma (ACT 22), Follow-up, and Allergic Rhinitis.    Chief Complaint   Patient presents with    Asthma     ACT 22    Follow-up    Allergic Rhinitis      Patient presents for F/U of 3.5 month F/U for Dupixent efficacy for nasal polyps and asthma.  Started Dupixent April 2025.   Patient is accompanied by his wife, Anne.    Since last visit, 3-13-25, patient states he started Dupixent around April 2025 and has had a significant improvement.   He is self-administering on his arms and denies any SE or injection site reactions.  He can taste and smell now, is less congested and his wife notices he snores less.  He feels he has more energy and believes it helps his asthma.  Patient does not have F/U scheduled with Dr. Faye.    Patient states he is using his Symbicort inhaler morning and night as directed.  He has not missed doses so is not sure if he would be symptomatic or not.    Patient's wife was a phlebotomist and currently works in the Hematology lab at .    Objective   /80   Pulse 78   Wt 136 kg (300 lb)   SpO2 96%   BMI 37.50 kg/m²      Physical Exam  Constitutional:       Appearance: Normal appearance.   HENT:      Head: Normocephalic and atraumatic.      Right Ear: External ear normal. There is no impacted cerumen.      Left Ear: External ear normal. There is no impacted cerumen.      Nose: No congestion or rhinorrhea.      Comments: He has a polypoid tissue in his left naris.  Eyes:      Extraocular Movements: Extraocular movements intact.      Conjunctiva/sclera: Conjunctivae normal.      Pupils: Pupils are equal, round, and reactive to light.   Cardiovascular:      Rate and Rhythm: Normal rate and regular rhythm.      Heart sounds: No murmur heard.     No friction rub. No gallop.   Pulmonary:      Effort: No respiratory distress.      Breath sounds: No wheezing, rhonchi or rales. "   Skin:     General: Skin is warm and dry.   Neurological:      Mental Status: He is alert.   Psychiatric:         Mood and Affect: Mood normal.         Behavior: Behavior normal.     Assessment/Plan     Nasal polyp  Since starting Dupixent April 2025, he is dramatically improved.  He self-administers with no SE or injection site reactions.  He can taste and smell now, is less congested, snores less and feels more engery.  He reports his asthma has also benefited greatly since starting Dupixent.    On exam, he has scant polypoid tissue in his left naris but otherwise looks great.    He will continue Dupixent every other week and Flonase daily.  I would like him to return to Dr. Faye in November 2025 for polyp recheck, which will be 6 months on Dupixent.    Asthma  ACT 22.  Sx are well-controlled and he is compliant with his medications.  He denies flares and feels Dupixent has contributed to his excellent symptom control.    He will continue Symbicort but decrease to two inhalations in the morning and continue albuterol as needed.    ISue Medical Scribe, am scribing for, and in the presence of Veronica Foley MD.     IVeronica MD, personally performed the services described in the documentation as scribed by Benito Snyder, in my presence, and confirm it is both accurate and complete.

## 2025-07-08 NOTE — PATIENT INSTRUCTIONS
Continue Flonase 2 sprays each side one time a day.    Continue Dupixent every other week.     Continue Symbicort but decrease to two inhalations in the morning.  Be sure to rinse your mouth and brush teeth after every use.      Continue albuterol as needed.    Follow up with Dr. Faye in November 2025 for polyp recheck.    Follow up in 6 months or sooner if problems or symptoms worsen prior.

## 2025-07-09 ENCOUNTER — HOSPITAL ENCOUNTER (OUTPATIENT)
Dept: RADIOLOGY | Facility: CLINIC | Age: 50
Discharge: HOME | End: 2025-07-09
Payer: COMMERCIAL

## 2025-07-09 DIAGNOSIS — Z79.01 ON APIXABAN THERAPY: ICD-10-CM

## 2025-07-09 DIAGNOSIS — I77.810 ASCENDING AORTA DILATATION: ICD-10-CM

## 2025-07-09 DIAGNOSIS — I10 BENIGN ESSENTIAL HYPERTENSION: Chronic | ICD-10-CM

## 2025-07-09 DIAGNOSIS — G47.33 OBSTRUCTIVE SLEEP APNEA SYNDROME: ICD-10-CM

## 2025-07-09 DIAGNOSIS — Z79.899 LONG TERM CURRENT USE OF AMIODARONE: ICD-10-CM

## 2025-07-09 DIAGNOSIS — I42.9 CARDIOMYOPATHY, UNSPECIFIED TYPE (MULTI): Chronic | ICD-10-CM

## 2025-07-09 DIAGNOSIS — E66.9 OBESITY (BMI 30-39.9): ICD-10-CM

## 2025-07-09 DIAGNOSIS — I48.0 PAROXYSMAL ATRIAL FIBRILLATION (MULTI): Chronic | ICD-10-CM

## 2025-07-09 DIAGNOSIS — J45.40 MODERATE PERSISTENT ASTHMA WITHOUT COMPLICATION (HHS-HCC): Chronic | ICD-10-CM

## 2025-07-09 PROCEDURE — 71046 X-RAY EXAM CHEST 2 VIEWS: CPT

## 2025-07-09 PROCEDURE — 71046 X-RAY EXAM CHEST 2 VIEWS: CPT | Performed by: RADIOLOGY

## 2025-07-10 ENCOUNTER — APPOINTMENT (OUTPATIENT)
Dept: ALLERGY | Facility: CLINIC | Age: 50
End: 2025-07-10
Payer: COMMERCIAL

## 2025-07-10 VITALS
SYSTOLIC BLOOD PRESSURE: 138 MMHG | HEART RATE: 78 BPM | WEIGHT: 300 LBS | BODY MASS INDEX: 37.5 KG/M2 | OXYGEN SATURATION: 96 % | DIASTOLIC BLOOD PRESSURE: 80 MMHG

## 2025-07-10 DIAGNOSIS — J33.9 NASAL POLYP: Primary | ICD-10-CM

## 2025-07-10 DIAGNOSIS — J45.40 MODERATE PERSISTENT ASTHMA WITHOUT COMPLICATION (HHS-HCC): ICD-10-CM

## 2025-07-10 PROBLEM — J45.50: Status: RESOLVED | Noted: 2025-03-13 | Resolved: 2025-07-10

## 2025-07-10 PROCEDURE — 99213 OFFICE O/P EST LOW 20 MIN: CPT | Performed by: ALLERGY & IMMUNOLOGY

## 2025-07-10 PROCEDURE — 3079F DIAST BP 80-89 MM HG: CPT | Performed by: ALLERGY & IMMUNOLOGY

## 2025-07-10 PROCEDURE — 96160 PT-FOCUSED HLTH RISK ASSMT: CPT | Performed by: ALLERGY & IMMUNOLOGY

## 2025-07-10 PROCEDURE — 3075F SYST BP GE 130 - 139MM HG: CPT | Performed by: ALLERGY & IMMUNOLOGY

## 2025-07-10 PROCEDURE — RXMED WILLOW AMBULATORY MEDICATION CHARGE

## 2025-07-10 RX ORDER — ALBUTEROL SULFATE 90 UG/1
2 INHALANT RESPIRATORY (INHALATION) EVERY 4 HOURS PRN
Qty: 18 G | Refills: 1 | Status: SHIPPED | OUTPATIENT
Start: 2025-07-10

## 2025-07-10 NOTE — ASSESSMENT & PLAN NOTE
Since starting Dupixent April 2025, he is dramatically improved.  He self-administers with no SE or injection site reactions.  He can taste and smell now, is less congested, snores less and feels more engery.  He reports his asthma has also benefited greatly since starting Dupixent.    On exam, he has scant polypoid tissue in his left naris but otherwise looks great.    He will continue Dupixent every other week and Flonase daily.  I would like him to return to Dr. Faye in November 2025 for polyp recheck, which will be 6 months on Dupixent.

## 2025-07-10 NOTE — ASSESSMENT & PLAN NOTE
ACT 22.  Sx are well-controlled and he is compliant with his medications.  He denies flares and feels Dupixent has contributed to his excellent symptom control.    He will continue Symbicort but decrease to two inhalations in the morning and continue albuterol as needed.

## 2025-07-11 ENCOUNTER — TELEPHONE (OUTPATIENT)
Dept: CARDIOLOGY | Facility: HOSPITAL | Age: 50
End: 2025-07-11
Payer: COMMERCIAL

## 2025-07-11 NOTE — TELEPHONE ENCOUNTER
7/11/25  0937  Called  chest x-ray results to patient with patient verbalizing understanding.          ----- Message from Tony Mckeon sent at 7/11/2025  9:13 AM EDT -----  Chest x-ray shows no evidence of acute cardiopulmonary process.  ----- Message -----  From: Interface, Radiology Results In  Sent: 7/10/2025   9:43 PM EDT  To: Tony Mckeon, DO

## 2025-07-16 ENCOUNTER — PHARMACY VISIT (OUTPATIENT)
Dept: PHARMACY | Facility: CLINIC | Age: 50
End: 2025-07-16
Payer: COMMERCIAL

## 2025-07-16 ENCOUNTER — TELEPHONE (OUTPATIENT)
Dept: CARDIOLOGY | Facility: HOSPITAL | Age: 50
End: 2025-07-16
Payer: COMMERCIAL

## 2025-07-16 DIAGNOSIS — I10 BENIGN ESSENTIAL HYPERTENSION: Chronic | ICD-10-CM

## 2025-07-16 PROCEDURE — RXMED WILLOW AMBULATORY MEDICATION CHARGE

## 2025-07-16 RX ORDER — LISINOPRIL 20 MG/1
20 TABLET ORAL 2 TIMES DAILY
Qty: 180 TABLET | Refills: 0 | Status: SHIPPED | OUTPATIENT
Start: 2025-07-16 | End: 2026-01-12

## 2025-07-16 NOTE — TELEPHONE ENCOUNTER
Pt called for refill of lisinopril to be sent to [facility]  [address] [address]  Phone: [phone]  Fax: [phone]

## 2025-07-17 ENCOUNTER — APPOINTMENT (OUTPATIENT)
Dept: PRIMARY CARE | Facility: CLINIC | Age: 50
End: 2025-07-17
Payer: COMMERCIAL

## 2025-07-17 VITALS — HEART RATE: 60 BPM | SYSTOLIC BLOOD PRESSURE: 124 MMHG | TEMPERATURE: 96.9 F | DIASTOLIC BLOOD PRESSURE: 82 MMHG

## 2025-07-17 DIAGNOSIS — L72.3 SEBACEOUS CYST: ICD-10-CM

## 2025-07-17 DIAGNOSIS — Z00.00 WELLNESS EXAMINATION: Primary | ICD-10-CM

## 2025-07-17 PROCEDURE — 1036F TOBACCO NON-USER: CPT | Performed by: FAMILY MEDICINE

## 2025-07-17 PROCEDURE — 3079F DIAST BP 80-89 MM HG: CPT | Performed by: FAMILY MEDICINE

## 2025-07-17 PROCEDURE — 3074F SYST BP LT 130 MM HG: CPT | Performed by: FAMILY MEDICINE

## 2025-07-17 PROCEDURE — 99396 PREV VISIT EST AGE 40-64: CPT | Performed by: FAMILY MEDICINE

## 2025-07-17 ASSESSMENT — ENCOUNTER SYMPTOMS
DIARRHEA: 0
RHINORRHEA: 0
TROUBLE SWALLOWING: 0
ABDOMINAL PAIN: 0
NAUSEA: 0
FEVER: 0
FREQUENCY: 0
APPETITE CHANGE: 0
SHORTNESS OF BREATH: 0
CHILLS: 0
HEADACHES: 0
DYSURIA: 0
FATIGUE: 0
SORE THROAT: 0
CONSTIPATION: 0
DIZZINESS: 0
PALPITATIONS: 0
VOMITING: 0
COUGH: 0

## 2025-07-17 NOTE — PROGRESS NOTES
Subjective   Patient ID: Francisco Ordaz III is a 50 y.o. male who presents for Asthma (Recheck ).    Francisco has been feeling well. No new concerns. Wants to make sure no lesions on his neck or back after had to have sebaceous cyst drained previously. Has been following with allergist for his asthma. Symptoms have been well-controlled. Is following with cardiology.          Review of Systems   Constitutional:  Negative for appetite change, chills, fatigue and fever.   HENT:  Negative for congestion, rhinorrhea, sore throat and trouble swallowing.    Eyes:  Negative for visual disturbance.   Respiratory:  Negative for cough and shortness of breath.    Cardiovascular:  Negative for chest pain and palpitations.   Gastrointestinal:  Negative for abdominal pain, constipation, diarrhea, nausea and vomiting.   Genitourinary:  Negative for dysuria and frequency.   Neurological:  Negative for dizziness and headaches.       Objective   /82   Pulse 60   Temp 36.1 °C (96.9 °F)     Physical Exam  Constitutional:       General: He is not in acute distress.     Appearance: Normal appearance.   HENT:      Head: Normocephalic and atraumatic.      Right Ear: Tympanic membrane normal.      Left Ear: Tympanic membrane normal.      Nose: No congestion or rhinorrhea.      Mouth/Throat:      Mouth: Mucous membranes are moist.      Pharynx: No oropharyngeal exudate or posterior oropharyngeal erythema.     Eyes:      Extraocular Movements: Extraocular movements intact.      Conjunctiva/sclera: Conjunctivae normal.       Cardiovascular:      Rate and Rhythm: Normal rate and regular rhythm.      Heart sounds: No murmur heard.  Pulmonary:      Breath sounds: No wheezing or rhonchi.   Abdominal:      General: Bowel sounds are normal.      Palpations: Abdomen is soft.      Tenderness: There is no abdominal tenderness. There is no guarding or rebound.     Musculoskeletal:         General: No swelling.      Cervical back: Neck supple.    Lymphadenopathy:      Cervical: No cervical adenopathy.     Skin:     General: Skin is warm and dry.      Comments: Two small sebaceous cysts at thoracic region. No significant swelling, redness.     Neurological:      Mental Status: He is alert.      Cranial Nerves: No cranial nerve deficit.      Motor: No weakness.      Coordination: Coordination normal.      Gait: Gait normal.     Psychiatric:         Mood and Affect: Mood normal.         Behavior: Behavior normal.         Assessment/Plan   Diagnoses and all orders for this visit:  Wellness examination  Comments:  CPE performed. Recommend pneumonia and Shingrix vaccine. Labs recently done through his insurance; will upload results.  Sebaceous cyst  Comments:  No active inflammation. Discussed keeping skin clean and dry to help prevent infection.  Other orders  -     Follow Up In Primary Care; Future

## 2025-07-18 NOTE — PROGRESS NOTES
[facility]    Patient Name: Donavan Ordaz III  Patient : 1975    Scribe Attestation  By signing my name below, Tonia BARROS, Giovanny attest that this documentation has been prepared under the direction and in the presence of Tony Mckeon DO.    Physician Attestation  Tony BARROS DO, personally performed the services described in the documentation as scribed by Tonia Sotelo in my presence, and confirm it is both accurate and complete.    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      HPI:  This is a 50-year-old male here for follow-up regarding paroxysmal atrial fibrillation, anticoagulation with apixaban, antiarrhythmic drug therapy with amiodarone, HFimpEF with an ejection fraction zully of 20% now improved to 55%, hypertension, ascending aortic aneurysm, obstructive sleep apnea, and obesity.  The patient was last evaluated by me in 2024.  At that visit I referred the patient to EP cardiology, referred the patient to endocrinology, ordered blood work including CMP/lipid/magnesium/CBC/TSH be drawn in 6 months, ordered a chest x-ray, ordered an echocardiogram, and asked the patient to follow-up in 6 months and sooner if necessary.  CMP done 2024 showed normal serum sodium and potassium with a serum creatinine of 1.14, normal ALT/AST, serum magnesium was 2.0. TSH was 2.18.  CBC showed a hemoglobin of 16.0. Lipid panel done in 2025 showed an LDL cholesterol of 86 and triglycerides of 73 not currently on any lipid lowering medication. Chest x-ray done in 2025 showed no evidence of acute cardiopulmonary process. Echocardiogram done in 2025  showed normal left ventricular systolic function with an ejection fraction of 55%, grade 1 diastolic dysfunction, normal right ventricular systolic function, and no significant valve abnormalities. ECG done today showed sinus rhythm with a heart rate of 60 bpm. The patient reports that he has been feeling generally well from the cardiac standpoint. He denies any new chest pain, shortness of breath, palpitations and lightheadedness. He states that he takes all of his medications as prescribed. During my exam, he was resting comfortably on the exam table.            Assessment/Plan:   1. Paroxysmal atrial fibrillation  The patient has a history of paroxysmal atrial fibrillation and underwent successful cardioversion on 9/14/2023.   He currently on apixaban for thromboembolism prophylaxis, which should be continued.  He should continue carvedilol for heart rate control.  He is currently on amiodarone to reduce his burden of atrial fibrillation.   ECG done today showed sinus rhythm with a heart rate of 60 bpm.   He denies chest pain, palpitations or lightheadedness.   Echocardiogram done in July 2025 showed normal left ventricular systolic function with an ejection fraction of 55%, grade 1 diastolic dysfunction, normal right ventricular systolic function, and no significant valve abnormalities.   Chest x-ray done in July 2025 showed no evidence of acute cardiopulmonary process.   Recent lab works as noted in the HPI.   Lab works as noted below will be done in 6 months prior to his next visit.   I will refer him to EP cardiology for further evaluation and management and consideration of ablation.   Follow up in 6 months and sooner if necessary.      2. Anticoagulation with apixaban  The patient is on anticoagulation with apixaban for paroxysmal atrial fibrillation.  Recent lab works as noted in the HPI.  Lab works as noted below will be done in 6 months prior to his next visit.      3. Antiarrhythmic drug therapy with  amiodarone   The patient is on antiarrhythmic drug therapy with amiodarone for paroxysmal atrial fibrillation.  Chest x-ray done in July 2025 showed no evidence of acute cardiopulmonary process.   Recent lab works as noted in the HPI.  Lab works as noted below will be done in 6 months prior to his next visit.   I will also refer him to  cardiology for further evaluation and management.     4. HFimpEF  The patient has HFimpEF with an ejection fraction zully of 20% now improved to 55%.  Echocardiogram done in July 2025 showed normal left ventricular systolic function with an ejection fraction of 55%, grade 1 diastolic dysfunction, normal right ventricular systolic function, and no significant valve abnormalities.   He does not appear significantly volume overloaded on exam today except for trivial pitting bilateral lower extremity edema.  He should continue his current cardiac medications.  Recent lab works as noted in the HPI.  Lab works as noted below will be done in 6 months prior to his next visit.   I discussed with him the importance of following a low-sodium heart healthy diet as well as weight loss.   Follow up in 6 months and sooner if necessary.      5. Hypertension  The patient has a history of hypertension which appears controlled on exam today.  He should continue his current antihypertensive medications and monitor his blood pressure at home.    6. Dyslipidemia  Lipid panel done in July 2025 showed an LDL cholesterol of 86 and triglycerides of 73 not currently on any lipid lowering medication.  Coronary artery calcium scoring was ordered as noted below.  Please see lifestyle recommendations below.    7. Ascending aortic aneurysm  The patient had a history of ascending aortic aneurysm seen on prior studies.  Echocardiogram done in July 2025 showed normal left ventricular systolic function with an ejection fraction of 55%, grade 1 diastolic dysfunction, normal right ventricular systolic function, and no  significant valve abnormalities.   Continue risk factor modification.    8. Obstructive sleep apnea  Management as per PCP.     9. Obesity  Please see lifestyle recommendations below.         Orders:   Referral to EP cardiology,   Coronary artery calcium scoring   CMP/magnesium/CBC/TSH in 6 months,   Follow-up in 6 months.    Lifestyle Recommendations  I recommend a whole-food plant-based diet, an eating pattern that encourages the consumption of unrefined plant foods (such as fruits, vegetables, tubers, whole grains, legumes, nuts and seeds) and discourages meats, dairy products, eggs and processed foods.     The AHA/ACC recommends that the patient consume a dietary pattern that emphasizes intake of vegetables, fruits, and whole grains; includes low-fat dairy products, poultry, fish, legumes, non-tropical vegetable oils, and nuts; and limits intake of sodium, sweets, sugar-sweetened beverages, and red meats.  Adapt this dietary pattern to appropriate calorie requirements (a 500-750 kcal/day deficit to loose weight), personal and cultural food preferences, and nutrition therapy for other medical conditions (including diabetes).  Achieve this pattern by following plans such as the [place] Mediterranean, DASH dietary pattern, or AHA diet.     Engage in 2 hours and 30 minutes per week of moderate-intensity physical activity, or 1 hour and 15 minutes (75 minutes) per week of vigorous-intensity aerobic physical activity, or an equivalent combination of moderate and vigorous-intensity aerobic physical activity. Aerobic activity should be performed in episodes of at least 10 minutes preferably spread throughout the week.     Adhering to a heart healthy diet, regular exercise habits, avoidance of tobacco products, and maintenance of a healthy weight are crucial components of their heart disease risk reduction.     Any positive review of systems not specifically addressed in the office visit today should be evaluated and  treated by the patients primary care physician or in an emergency department if necessary     Patient was notified that results from ordered tests will be called to the patient if it changes current management; it will otherwise be discussed at a future appointment and available on [facility].     Thank you for allowing me to participate in the care of this patient.        This document was generated using the assistance of voice recognition software. If there are any errors of spelling, grammar, syntax, or meaning; please feel free to contact me directly for clarification.    Past Medical History:  He has a past medical history of Personal history of other diseases of the respiratory system (01/03/2018), Personal history of other diseases of the respiratory system (10/30/2019), and Unspecified foreign body in bronchus causing asphyxiation, initial encounter (10/01/2020).    Past Surgical History:  He has a past surgical history that includes Other surgical history (05/21/2020) and Other surgical history (05/20/2020).      Social History:  He reports that he has never smoked. He has never used smokeless tobacco. He reports that he does not currently use alcohol. He reports that he does not use drugs.    Family History:  Family History  Problem Relation Name Age of Onset    Hypertension Mother          Allergies:  Patient has no known allergies.    Outpatient Medications:  Current Outpatient Medications   Medication Instructions    albuterol (Ventolin HFA) 90 mcg/actuation inhaler Inhale 2 puffs by mouth into the lungs every 4 hours if needed for wheezing or shortness of breath.    albuterol 2.5 mg, Every 4 hours PRN    amiodarone (Pacerone) 200 mg tablet TAKE 1 TABLET BY MOUTH DAILY    budesonide-formoterol (Symbicort) 160-4.5 mcg/actuation inhaler 2 puffs, inhalation, 2 times daily RT, Rinse mouth with water after use to reduce aftertaste and incidence of candidiasis. Do not swallow.    carvedilol (Coreg) 25 mg  "tablet TAKE 1 TABLET (25 MG) BY MOUTH TWICE A DAY WITH MEALS    dupilumab (Dupixent) 300 mg/2 mL prefilled syringe Inject 300 mg (1 syringe) under the skin every 14 days    Eliquis 5 mg, oral, 2 times daily    fluticasone (Flonase) 50 mcg/actuation nasal spray 2 sprays, Each Nostril, Daily before breakfast    lisinopril 20 mg, oral, 2 times daily    lisinopril 20 mg, oral, 2 times daily    spironolactone (ALDACTONE) 25 mg, oral, Daily        ROS:  A 14 point review of systems was done and is negative other than as stated in HPI    Vitals:      12/3/2024     3:54 PM 1/20/2025    11:10 AM 2/7/2025    11:58 AM 2/7/2025     1:31 PM 3/13/2025     9:56 AM 7/10/2025     9:27 AM 7/17/2025    10:52 AM   Vitals   Systolic 128 126 140 125  138 124   Diastolic 74 84 92 80  80 82   BP Location Left arm         Heart Rate 82 60 66   78 60   Temp  36.1 °C (97 °F) 36.4 °C (97.5 °F)    36.1 °C (96.9 °F)   Height 1.905 m (6' 3\")    1.905 m (6' 3\")     Weight (lb) 301.2  314.6  290 300    BMI 37.65 kg/m2  39.32 kg/m2  36.25 kg/m2 37.5 kg/m2    BSA (m2) 2.69 m2  2.75 m2  2.64 m2 2.68 m2         Physical Exam:   Constitutional: Cooperative, in no acute distress, alert, appears stated age.  Skin: Skin color, texture, turgor normal. No rashes or lesions.  Head: Normocephalic. No masses, lesions, tenderness or abnormalities  Eyes: Extraocular movements are grossly intact.  Mouth and throat: Mucous membranes moist  Neck: Neck supple, no carotid bruits, no JVD  Respiratory: Lungs clear to auscultation, no wheezing or rhonchi, no use of accessory muscles  Chest wall: No scars, normal excursion with respiration  Cardiovascular: Regular rhythm without murmur  Gastrointestinal: Abdomen soft, nontender. Bowel sounds normal. Obese  Musculoskeletal: Strength equal in upper extremities  Extremities: Trivial pitting edema  Neurologic: Sensation grossly intact, alert and oriented x3    Intake/Output:   No intake/output data recorded.    Outpatient " Medications  Current Outpatient Medications on File Prior to Visit   Medication Sig Dispense Refill    albuterol (Ventolin HFA) 90 mcg/actuation inhaler Inhale 2 puffs by mouth into the lungs every 4 hours if needed for wheezing or shortness of breath. 18 g 1    albuterol 2.5 mg /3 mL (0.083 %) nebulizer solution Take 3 mL (2.5 mg) by nebulization every 4 hours if needed.      amiodarone (Pacerone) 200 mg tablet TAKE 1 TABLET BY MOUTH DAILY 180 tablet 1    apixaban (Eliquis) 5 mg tablet Take 1 tablet (5 mg) by mouth 2 times a day. 180 tablet 1    budesonide-formoterol (Symbicort) 160-4.5 mcg/actuation inhaler Inhale 2 puffs 2 times a day. Rinse mouth with water after use to reduce aftertaste and incidence of candidiasis. Do not swallow. 30.6 g 2    carvedilol (Coreg) 25 mg tablet TAKE 1 TABLET (25 MG) BY MOUTH TWICE A DAY WITH MEALS 180 tablet 3    dupilumab (Dupixent) 300 mg/2 mL prefilled syringe Inject 300 mg (1 syringe) under the skin every 14 days 4 mL 11    fluticasone (Flonase) 50 mcg/actuation nasal spray Administer 2 sprays into each nostril once daily in the morning. Take before meals. 48 g 1    lisinopril 20 mg tablet Take 1 tablet (20 mg) by mouth 2 times a day. 180 tablet 1    lisinopril 20 mg tablet Take 1 tablet (20 mg) by mouth 2 times a day. 180 tablet 0    spironolactone (Aldactone) 25 mg tablet Take 1 tablet (25 mg) by mouth once daily. 90 tablet 3     No current facility-administered medications on file prior to visit.       Labs: (past 26 weeks)  Recent Results (from the past 26 weeks)   Tissue/Wound Culture/Smear    Collection Time: 02/07/25  1:32 PM    Specimen: Wound/Tissue; Tissue/Biopsy   Result Value Ref Range    Tissue/Wound Culture/Smear (1+) Rare Mixed Anaerobic Bacteria     Beta Lactamase (Cefinase) Negative     Gram Stain (4+) Abundant Polymorphonuclear leukocytes (A)     Gram Stain (1+) Rare Gram positive cocci (A)    Alpha-1 Antitrypsin Phenotype    Collection Time: 03/13/25 11:17  AM   Result Value Ref Range    ALPHA 1 ANTITRYPSIN (AAT) PHENOTYPE SEE NOTE    CBC and Auto Differential    Collection Time: 03/13/25 11:20 AM   Result Value Ref Range    WHITE BLOOD CELL COUNT 7.7 3.8 - 10.8 Thousand/uL    RED BLOOD CELL COUNT 4.93 4.20 - 5.80 Million/uL    HEMOGLOBIN 16.5 13.2 - 17.1 g/dL    HEMATOCRIT 49.9 38.5 - 50.0 %    .2 (H) 80.0 - 100.0 fL    MCH 33.5 (H) 27.0 - 33.0 pg    MCHC 33.1 32.0 - 36.0 g/dL    RDW 11.9 11.0 - 15.0 %    PLATELET COUNT 272 140 - 400 Thousand/uL    MPV 10.7 7.5 - 12.5 fL    ABSOLUTE NEUTROPHILS 6,476 1,500 - 7,800 cells/uL    ABSOLUTE LYMPHOCYTES 878 850 - 3,900 cells/uL    ABSOLUTE MONOCYTES 270 200 - 950 cells/uL    ABSOLUTE EOSINOPHILS 54 15 - 500 cells/uL    ABSOLUTE BASOPHILS 23 0 - 200 cells/uL    NEUTROPHILS 84.1 %    LYMPHOCYTES 11.4 %    MONOCYTES 3.5 %    EOSINOPHILS 0.7 %    BASOPHILS 0.3 %   Immunoglobulin IgE    Collection Time: 03/13/25 11:20 AM   Result Value Ref Range    IMMUNOGLOBULIN E 331 (H) <YP=620 kU/L   [facility] VITAMIN B12/FOLATE, SERUM PANEL (REFL)    Collection Time: 03/13/25 11:20 AM   Result Value Ref Range    VITAMIN B12 416 200 - 1,100 pg/mL    FOLATE, SERUM 4.2 (L) ng/mL   Pulmonary function testing    Collection Time: 03/13/25 11:38 AM   Result Value Ref Range    FEV1 55 liters    FVC 47 liters    FEV1/ %   Cologuard® colon cancer screening    Collection Time: 03/26/25  4:30 PM   Result Value Ref Range    NONINV COLON CA DNA+OCC BLD SCRN STL QL Negative Negative       ECG  No results found for this or any previous visit (from the past 4464 hours).    Echocardiogram  No results found for this or any previous visit from the past 1095 days.      CV Studies:  EKG: No results found for this or any previous visit (from the past 4464 hours).  Echocardiogram:   [place] [address]  Phone [phone] Fax [phone]    TRANSTHORACIC ECHOCARDIOGRAM  REPORT      Patient Name:     BRAYAN HICKEY   Reading Physician:  [id number] Sameer Malin MD  III  Study Date:       9/19/2023            Referring           KRISTI BAKER  Physician:  MRN/PID:          [id number]             PCP:  Accession/Order#: [id number]         [facility] Echo  Location:           Lab  YOB: 1975            Fellow:  Gender:           M                    Nurse:  Admit Date:                            Sonographer:        Sofia [facility]  Admission Status: Outpatient           Additional Staff:  Height:           187.00 cm            CC Report to:  Weight:           131.00 kg            Study Type:         Echocardiogram  BSA:              2.53 m2  Blood Pressure: 132 /91 mmHg    Diagnosis/ICD: I42.9-Cardiomyopathy, unspecified; I48.0-Paroxysmal atrial  fibrillation  Indication:    Cardiomyopathy, Afib  Procedure/CPT: Echo Complete w Full Doppler-[id number]    Patient History:  Pertinent History: A-Fib.    Study Detail: The following Echo studies were performed: 2D, M-Mode, Doppler and  color flow. Technically challenging study due to body habitus and  prominent lung artifact.      PHYSICIAN INTERPRETATION:  Left Ventricle: Left ventricular systolic function is mildly decreased, with an estimated ejection fraction of 45-50%. There is global hypokinesis of the left ventricle with minor regional variations. The left ventricular cavity size is normal. There is mild to moderate left ventricular hypertrophy. Left ventricular diastolic filling was indeterminate.  Left Atrium: The left atrium is mild to moderately dilated.  Right Ventricle: The right ventricle is slightly enlarged. There is normal right ventricular global systolic function.  Right Atrium: The right atrium is mildly dilated.  Aortic Valve: The aortic valve appears structurally normal. There is no evidence of aortic valve regurgitation.  Mitral Valve: The mitral valve is normal in structure. There is  trace mitral valve regurgitation.  Tricuspid Valve: The tricuspid valve is structurally normal. No evidence of tricuspid regurgitation.  Pulmonic Valve: The pulmonic valve is structurally normal. There is no indication of pulmonic valve regurgitation.  Pericardium: There is no pericardial effusion noted.  Aorta: The aortic root is normal. There is mild dilatation of the ascending aorta.      CONCLUSIONS:  1. Left ventricular systolic function is mildly decreased with a 45-50% estimated ejection fraction.  2. The left atrium is mild to moderately dilated.  3. There is global hypokinesis of the left ventricle with minor regional variations.    QUANTITATIVE DATA SUMMARY:  2D MEASUREMENTS:  Normal Ranges:  Ao Root d:     3.50 cm   (2.0-3.7cm)  LAs:           3.98 cm   (2.7-4.0cm)  IVSd:          1.45 cm   (0.6-1.1cm)  LVPWd:         1.43 cm   (0.6-1.1cm)  LVIDd:         3.82 cm   (3.9-5.9cm)  LVIDs:         2.93 cm  LV Mass Index: 80.6 g/m2  LV % FS        23.2 %    LA VOLUME:  Normal Ranges:  LA Vol A4C:        74.8 ml    (22+/-6mL/m2)  LA Vol A2C:        93.9 ml  LA Vol BP:         87.5 ml  LA Vol Index A4C:  29.6ml/m2  LA Vol Index A2C:  37.1 ml/m2  LA Vol Index BP:   34.6 ml/m2  LA Area A4C:       24.1 cm2  LA Area A2C:       28.2 cm2  LA Major Axis A4C: 6.6 cm  LA Major Axis A2C: 7.2 cm  LA Vol A4C:        72.0 ml  LA Vol A2C:        92.5 ml    RA VOLUME BY A/L METHOD:  Normal Ranges:  RA Area A4C: 21.6 cm2    AORTA MEASUREMENTS:  Normal Ranges:  Ao STJ, d: 3.10 cm (1.7-3.4cm)  Asc Ao, d: 4.00 cm (2.1-3.4cm)    LV SYSTOLIC FUNCTION BY 2D PLANIMETRY (MOD):  Normal Ranges:  EF-A4C View: 49.3 % (>=55%)  EF-A2C View: 48.1 %  EF-Biplane:  48.0 %    LV DIASTOLIC FUNCTION:  Normal Ranges:  MV Peak E:    0.66 m/s (0.7-1.2 m/s)  MV Peak A:    0.01 m/s (0.42-0.7 m/s)  E/A Ratio:    73.44    (1.0-2.2)  MV e'         0.16 m/s (>8.0)  MV lateral e' 0.18 m/s  MV medial e'  0.13 m/s  E/e' Ratio:   4.27     (<8.0)    MITRAL  VALVE:  Normal Ranges:  MV DT: 154 msec (150-240msec)    AORTIC VALVE:  Normal Ranges:  LVOT Max Jorge:  0.94 m/s (<=1.1m/s)  LVOT VTI:      15.24 cm  LVOT Diameter: 2.41 cm  (1.8-2.4cm)      RIGHT VENTRICLE:  RV Basal 4.25 cm  RV Mid   3.70 cm  RV Major 7.3 cm  TAPSE:   17.4 mm  RV s'    0.16 m/s    TRICUSPID VALVE/RVSP:  Normal Ranges:  IVC Diam: 1.83 cm  TV e'     0.1 m/s    AORTA:  Asc Ao Diam 4.03 cm      [id number] Sameer Malin MD  Electronically signed on 9/19/2023 at 2:02:54 PM         Final     Stress Testing IMGRESULT(DSQ7040:1:1825): No results found for this or any previous visit from the past 1825 days.    Cardiac Catheterization: No results found for this or any previous visit from the past 1825 days.  No results found for this or any previous visit from the past 3650 days.     Cardiac Scoring: No results found for this or any previous visit from the past 1825 days.    AAA : No results found for this or any previous visit from the past 1825 days.    OTHER: No results found for this or any previous visit from the past 1825 days.    LAST IMAGING RESULTS  XR chest 2 views  Narrative: Interpreted By:  Skip Ferrer,   STUDY:  XR CHEST 2 VIEWS;  7/9/2025 12:41 pm      INDICATION:  Signs/Symptoms:Amiodarone therapy.      ,I48.0 Paroxysmal atrial fibrillation (Multi),Z79.01 Long term  (current) use of anticoagulants,Z79.899 Other long term (current)  drug therapy,I42.9 Cardiomyopathy, unspecified,I10 Essential  (primary) hypertension,I77.810 Thoracic aortic ectasia,G47.33  Obstructive sleep apnea (adult) (pediatric),E66.9 Obesity, unspecified      COMPARISON:  None.      ACCESSION NUMBER(S):  [id number]      ORDERING CLINICIAN:  KRISTI BAKER      FINDINGS:                  CARDIOMEDIASTINAL SILHOUETTE:  Cardiomediastinal silhouette is normal in size and configuration.      LUNGS:  Lungs are clear. There is no consolidation or effusion seen      ABDOMEN:  No remarkable upper abdominal findings.      BONES:  No  acute osseous changes.      Impression: 1.  No evidence of acute cardiopulmonary process.              MACRO:  None      Signed by: Skip Ferrer 7/10/2025 9:42 PM  Dictation workstation:   [id number]      Problem List Items Addressed This Visit       Benign essential hypertension (Chronic)    Obstructive sleep apnea syndrome    Paroxysmal atrial fibrillation (Multi) - Primary (Chronic)    On apixaban therapy    Long term current use of amiodarone    Obesity (BMI 30-39.9)    Ascending aorta dilatation    Heart failure with improved ejection fraction (HFimpEF)    Dyslipidemia            Tony Mckeon DO, FACC, FACOI

## 2025-07-22 ENCOUNTER — PHARMACY VISIT (OUTPATIENT)
Dept: PHARMACY | Facility: CLINIC | Age: 50
End: 2025-07-22
Payer: COMMERCIAL

## 2025-07-23 ENCOUNTER — SPECIALTY PHARMACY (OUTPATIENT)
Dept: PHARMACY | Facility: CLINIC | Age: 50
End: 2025-07-23

## 2025-07-23 PROCEDURE — RXMED WILLOW AMBULATORY MEDICATION CHARGE

## 2025-07-23 NOTE — PROGRESS NOTES
"  [facility] Clinical Note  Patient Reassessment     Introduction  Donavan Ordaz III \"Francisco\" is a 50 y.o. male who is on the specialty pharmacy service for management of: Immunology Core.      [facility] supplied medication: Dupixent 300mg under the skin every 14 days    Duration of therapy: Maintenance    The most recent encounter visit with the referring prescriber Veronica Foley MD on 7/10/2025 was reviewed. Pharmacy will continue to collaborate in the care of this patient with the referring prescriber.    Discussion  Donavan was contacted on 7/23/2025 at 11:45 AM for a pharmacy visit with encounter number [id number] from:   [facility]  [address] [place] [id number]  Dept: [phone]  Dept Fax: [phone]  Donavan consented to a/an Telephone visit, which was performed.    Efficacy  Patient has developed new symptoms of condition: No  Patient/caregiver feels medication is affecting the disease state: Stated he has not been sick. Report he is breathing really well through nose. Denies any issues with taste or smell.    Goals  Provided education on goals and possible outcomes of therapy:  Adherence with therapy  Timely completion of appropriate labs  Timely and appropriate follow up with provider  Identify and address medication interactions with presciption medications, OTC medications and supplements  Optimize or maintain quality of life  Asthma/Immunology: Reduce size of nasal polyps  Relieve nasal congestion, improve breathing, and improve sense of smell (nasal polyps)  Patient has documented target(s) for goals of therapy: No  Patient status for goal(s): On track    Tolerance  Patient has experienced side effects from this medication: No  Changes to current therapy regimen: No    The follow-up timeline was discussed. Every person responds to and reacts to therapy differently. Patient should be assessed for efficacy and tolerability in " approximately: 3 months     Adherence  Patient Information  Informant: Self (Patient)  Demonstrates Understanding of Importance of Adherence: Yes  Does the patient have any barriers to self-administration (including physical and mental?): No  Support Network for Adherence: Healthcare Provider  Adherence Tools Used: Calendar  Medication Information  Medication: dupilumab (Dupixent)  Patient Reported Missed Doses in the Last 4 Weeks: 0 (Next dose due next Friday per patient)  Estimated Medication Adherence Level: Good  Adherence Estimation Source: Claims history  Barriers to Adherence: No Problems identified     The importance of adherence was discussed and patient/caregiver was advised to take the medication as prescribed by their provider. Encouraged patient/caregiver to call physician's office or specialty pharmacy if they have a question regarding a missed dose.    General Assessment  Changes to home medications, OTCs or supplements: No  Current Medications[1]  Reported new allergies: No  Reported new medical conditions: No  Additional monitoring reviewed: No  Is laboratory follow up needed? Per MD    Advised to contact the pharmacy if there are any changes to the patient's medication list, including prescriptions, OTC medications, herbal products, or supplements.    Impression/Plan  This patient has not been identified as high risk due to Lack of high risk qualifiers.  The following action was taken:N/A    QOL/Patient Satisfaction  Rate your quality of life on scale of 1-10: 9  Rate your satisfaction with  Specialty Pharmacy on scale of 1-10: 10 - Completely satisfied    Provided contact information ([phone]) for [facility] and reviewed dispensing process, refill timeline and patient management follow up. Confirmed understanding of education conducted during assessment. All questions and concerns were addressed and patient/caregiver was encouraged to reach out for additional  questions or concerns.    Based on the patient's diagnosis, medication list, progress towards goals, adherence, tolerance, and medication list, medication remains appropriate: Therapy remains appropriate (I attest)    Valery Clifford, PharmD       [1]   Current Outpatient Medications   Medication Sig Dispense Refill    albuterol (Ventolin HFA) 90 mcg/actuation inhaler Inhale 2 puffs by mouth into the lungs every 4 hours if needed for wheezing or shortness of breath. 18 g 1    albuterol 2.5 mg /3 mL (0.083 %) nebulizer solution Take 3 mL (2.5 mg) by nebulization every 4 hours if needed.      amiodarone (Pacerone) 200 mg tablet TAKE 1 TABLET BY MOUTH DAILY 180 tablet 1    apixaban (Eliquis) 5 mg tablet Take 1 tablet (5 mg) by mouth 2 times a day. 180 tablet 1    budesonide-formoterol (Symbicort) 160-4.5 mcg/actuation inhaler Inhale 2 puffs 2 times a day. Rinse mouth with water after use to reduce aftertaste and incidence of candidiasis. Do not swallow. 30.6 g 2    carvedilol (Coreg) 25 mg tablet TAKE 1 TABLET (25 MG) BY MOUTH TWICE A DAY WITH MEALS 180 tablet 3    dupilumab (Dupixent) 300 mg/2 mL prefilled syringe Inject 300 mg (1 syringe) under the skin every 14 days 4 mL 11    fluticasone (Flonase) 50 mcg/actuation nasal spray Administer 2 sprays into each nostril once daily in the morning. Take before meals. 48 g 1    lisinopril 20 mg tablet Take 1 tablet (20 mg) by mouth 2 times a day. 180 tablet 1    lisinopril 20 mg tablet Take 1 tablet (20 mg) by mouth 2 times a day. 180 tablet 0    spironolactone (Aldactone) 25 mg tablet Take 1 tablet (25 mg) by mouth once daily. 90 tablet 3     No current facility-administered medications for this visit.

## 2025-07-25 ENCOUNTER — HOSPITAL ENCOUNTER (OUTPATIENT)
Dept: CARDIOLOGY | Facility: HOSPITAL | Age: 50
Discharge: HOME | End: 2025-07-25
Payer: COMMERCIAL

## 2025-07-25 ENCOUNTER — RESULTS FOLLOW-UP (OUTPATIENT)
Dept: CARDIOLOGY | Facility: HOSPITAL | Age: 50
End: 2025-07-25

## 2025-07-25 DIAGNOSIS — I48.0 PAROXYSMAL ATRIAL FIBRILLATION (MULTI): Chronic | ICD-10-CM

## 2025-07-25 DIAGNOSIS — Z79.01 ON APIXABAN THERAPY: ICD-10-CM

## 2025-07-25 DIAGNOSIS — E66.9 OBESITY (BMI 30-39.9): ICD-10-CM

## 2025-07-25 DIAGNOSIS — I77.810 ASCENDING AORTA DILATATION: ICD-10-CM

## 2025-07-25 DIAGNOSIS — G47.33 OBSTRUCTIVE SLEEP APNEA SYNDROME: ICD-10-CM

## 2025-07-25 DIAGNOSIS — Z79.899 LONG TERM CURRENT USE OF AMIODARONE: ICD-10-CM

## 2025-07-25 DIAGNOSIS — I42.9 CARDIOMYOPATHY, UNSPECIFIED TYPE (MULTI): Chronic | ICD-10-CM

## 2025-07-25 DIAGNOSIS — I10 BENIGN ESSENTIAL HYPERTENSION: Chronic | ICD-10-CM

## 2025-07-25 LAB
ALBUMIN SERPL-MCNC: 4.3 G/DL (ref 3.6–5.1)
ALP SERPL-CCNC: 54 U/L (ref 35–144)
ALT SERPL-CCNC: 30 U/L (ref 9–46)
ANION GAP SERPL CALCULATED.4IONS-SCNC: 12 MMOL/L (CALC) (ref 7–17)
AORTIC VALVE MEAN GRADIENT: 2 MMHG
AORTIC VALVE PEAK VELOCITY: 1.08 M/S
AST SERPL-CCNC: 28 U/L (ref 10–35)
AV PEAK GRADIENT: 5 MMHG
AVA (PEAK VEL): 3.25 CM2
AVA (VTI): 3.76 CM2
BILIRUB SERPL-MCNC: 0.8 MG/DL (ref 0.2–1.2)
BUN SERPL-MCNC: 20 MG/DL (ref 7–25)
CALCIUM SERPL-MCNC: 9.4 MG/DL (ref 8.6–10.3)
CHLORIDE SERPL-SCNC: 104 MMOL/L (ref 98–110)
CHOLEST SERPL-MCNC: 189 MG/DL
CHOLEST/HDLC SERPL: 2.2 (CALC)
CO2 SERPL-SCNC: 23 MMOL/L (ref 20–32)
CREAT SERPL-MCNC: 1.14 MG/DL (ref 0.7–1.3)
EGFRCR SERPLBLD CKD-EPI 2021: 77 ML/MIN/1.73M2
EJECTION FRACTION APICAL 4 CHAMBER: 59.1
EJECTION FRACTION: 55 %
ERYTHROCYTE [DISTWIDTH] IN BLOOD BY AUTOMATED COUNT: 12.2 % (ref 11–15)
GLOBAL LONGITUDINAL STRAIN: 12.6 %
GLUCOSE SERPL-MCNC: 90 MG/DL (ref 65–99)
HCT VFR BLD AUTO: 48.4 % (ref 38.5–50)
HDLC SERPL-MCNC: 87 MG/DL
HGB BLD-MCNC: 16 G/DL (ref 13.2–17.1)
LDLC SERPL CALC-MCNC: 86 MG/DL (CALC)
LEFT ATRIUM VOLUME AREA LENGTH INDEX BSA: 25.3 ML/M2
LEFT VENTRICLE INTERNAL DIMENSION DIASTOLE: 5.32 CM (ref 3.5–6)
LEFT VENTRICULAR OUTFLOW TRACT DIAMETER: 2.51 CM
LV EJECTION FRACTION BIPLANE: 53 %
MAGNESIUM SERPL-MCNC: 2 MG/DL (ref 1.5–2.5)
MCH RBC QN AUTO: 33.5 PG (ref 27–33)
MCHC RBC AUTO-ENTMCNC: 33.1 G/DL (ref 32–36)
MCV RBC AUTO: 101.3 FL (ref 80–100)
MITRAL VALVE E/A RATIO: 0.8
MITRAL VALVE E/E' RATIO: 8.21
NONHDLC SERPL-MCNC: 102 MG/DL (CALC)
PLATELET # BLD AUTO: 270 THOUSAND/UL (ref 140–400)
PMV BLD REES-ECKER: 9.9 FL (ref 7.5–12.5)
POTASSIUM SERPL-SCNC: 5 MMOL/L (ref 3.5–5.3)
PROT SERPL-MCNC: 6.7 G/DL (ref 6.1–8.1)
RBC # BLD AUTO: 4.78 MILLION/UL (ref 4.2–5.8)
RIGHT VENTRICLE FREE WALL PEAK S': 13.93 CM/S
RIGHT VENTRICLE PEAK SYSTOLIC PRESSURE: 30 MMHG
SODIUM SERPL-SCNC: 139 MMOL/L (ref 135–146)
TRICUSPID ANNULAR PLANE SYSTOLIC EXCURSION: 2.1 CM
TRIGL SERPL-MCNC: 73 MG/DL
TSH SERPL-ACNC: 2.18 MIU/L (ref 0.4–4.5)
WBC # BLD AUTO: 5.8 THOUSAND/UL (ref 3.8–10.8)

## 2025-07-25 PROCEDURE — 93306 TTE W/DOPPLER COMPLETE: CPT | Performed by: INTERNAL MEDICINE

## 2025-07-25 PROCEDURE — 93306 TTE W/DOPPLER COMPLETE: CPT

## 2025-07-25 PROCEDURE — 93356 MYOCRD STRAIN IMG SPCKL TRCK: CPT | Performed by: INTERNAL MEDICINE

## 2025-07-25 NOTE — TELEPHONE ENCOUNTER
7/25/25  1238  Called patient; no answer. Left brief voice message for patient giving echocardiogram results, to continue current treatment and to return call if questions.              ----- Message from Tony Mckeon sent at 7/25/2025 12:29 PM EDT -----  Echocardiogram shows normal left ventricular systolic function with an ejection fraction of 55%, grade 1 diastolic dysfunction, normal right ventricular systolic function, and no significant valve   abnormalities.  The function is improved compared to prior.  Continue current medical therapy.  ----- Message -----  From: [facility] Syng[facility] - Cardiology Results In  Sent: 7/25/2025  12:21 PM EDT  To: Tony Mckeon, DO

## 2025-08-05 ENCOUNTER — APPOINTMENT (OUTPATIENT)
Dept: CARDIOLOGY | Facility: CLINIC | Age: 50
End: 2025-08-05
Payer: COMMERCIAL

## 2025-08-05 VITALS
HEART RATE: 60 BPM | DIASTOLIC BLOOD PRESSURE: 86 MMHG | WEIGHT: 300 LBS | BODY MASS INDEX: 37.3 KG/M2 | HEIGHT: 75 IN | SYSTOLIC BLOOD PRESSURE: 122 MMHG

## 2025-08-05 DIAGNOSIS — I50.32 HEART FAILURE WITH IMPROVED EJECTION FRACTION (HFIMPEF): ICD-10-CM

## 2025-08-05 DIAGNOSIS — E66.9 OBESITY (BMI 30-39.9): ICD-10-CM

## 2025-08-05 DIAGNOSIS — I77.810 ASCENDING AORTA DILATATION: ICD-10-CM

## 2025-08-05 DIAGNOSIS — G47.33 OBSTRUCTIVE SLEEP APNEA SYNDROME: ICD-10-CM

## 2025-08-05 DIAGNOSIS — I10 BENIGN ESSENTIAL HYPERTENSION: Chronic | ICD-10-CM

## 2025-08-05 DIAGNOSIS — I48.0 PAROXYSMAL ATRIAL FIBRILLATION (MULTI): Primary | Chronic | ICD-10-CM

## 2025-08-05 DIAGNOSIS — Z79.899 LONG TERM CURRENT USE OF AMIODARONE: ICD-10-CM

## 2025-08-05 DIAGNOSIS — Z79.01 ON APIXABAN THERAPY: ICD-10-CM

## 2025-08-05 PROBLEM — E78.5 DYSLIPIDEMIA: Status: ACTIVE | Noted: 2025-08-05

## 2025-08-05 PROCEDURE — 93000 ELECTROCARDIOGRAM COMPLETE: CPT | Performed by: INTERNAL MEDICINE

## 2025-08-05 PROCEDURE — 3079F DIAST BP 80-89 MM HG: CPT | Performed by: INTERNAL MEDICINE

## 2025-08-05 PROCEDURE — 1036F TOBACCO NON-USER: CPT | Performed by: INTERNAL MEDICINE

## 2025-08-05 PROCEDURE — 3074F SYST BP LT 130 MM HG: CPT | Performed by: INTERNAL MEDICINE

## 2025-08-05 PROCEDURE — 3008F BODY MASS INDEX DOCD: CPT | Performed by: INTERNAL MEDICINE

## 2025-08-05 PROCEDURE — 99214 OFFICE O/P EST MOD 30 MIN: CPT | Performed by: INTERNAL MEDICINE

## 2025-08-07 ENCOUNTER — PHARMACY VISIT (OUTPATIENT)
Dept: PHARMACY | Facility: CLINIC | Age: 50
End: 2025-08-07
Payer: COMMERCIAL

## 2025-08-13 DIAGNOSIS — I48.91 ATRIAL FIBRILLATION, UNSPECIFIED TYPE (MULTI): ICD-10-CM

## 2025-08-13 RX ORDER — APIXABAN 5 MG/1
5 TABLET, FILM COATED ORAL 2 TIMES DAILY
Qty: 180 TABLET | Refills: 1 | Status: CANCELLED | OUTPATIENT
Start: 2025-08-13

## 2025-08-15 DIAGNOSIS — I48.91 ATRIAL FIBRILLATION, UNSPECIFIED TYPE (MULTI): ICD-10-CM

## 2025-08-16 PROCEDURE — RXMED WILLOW AMBULATORY MEDICATION CHARGE

## 2025-08-17 DIAGNOSIS — I48.91 ATRIAL FIBRILLATION, UNSPECIFIED TYPE (MULTI): ICD-10-CM

## 2025-08-17 RX ORDER — APIXABAN 5 MG/1
5 TABLET, FILM COATED ORAL 2 TIMES DAILY
Qty: 180 TABLET | Refills: 3 | Status: CANCELLED | OUTPATIENT
Start: 2025-08-15

## 2025-08-19 DIAGNOSIS — I48.91 ATRIAL FIBRILLATION, UNSPECIFIED TYPE (MULTI): ICD-10-CM

## 2025-08-25 ENCOUNTER — PHARMACY VISIT (OUTPATIENT)
Dept: PHARMACY | Facility: CLINIC | Age: 50
End: 2025-08-25
Payer: COMMERCIAL

## 2025-08-29 ENCOUNTER — SPECIALTY PHARMACY (OUTPATIENT)
Dept: PHARMACY | Facility: CLINIC | Age: 50
End: 2025-08-29

## 2025-08-29 PROCEDURE — RXMED WILLOW AMBULATORY MEDICATION CHARGE

## 2025-09-02 DIAGNOSIS — I48.91 ATRIAL FIBRILLATION, UNSPECIFIED TYPE (MULTI): ICD-10-CM

## 2025-09-06 ENCOUNTER — PHARMACY VISIT (OUTPATIENT)
Dept: PHARMACY | Facility: CLINIC | Age: 50
End: 2025-09-06
Payer: COMMERCIAL

## 2026-01-15 ENCOUNTER — APPOINTMENT (OUTPATIENT)
Dept: ALLERGY | Facility: CLINIC | Age: 51
End: 2026-01-15
Payer: COMMERCIAL